# Patient Record
Sex: MALE | Race: WHITE | NOT HISPANIC OR LATINO | Employment: FULL TIME | URBAN - METROPOLITAN AREA
[De-identification: names, ages, dates, MRNs, and addresses within clinical notes are randomized per-mention and may not be internally consistent; named-entity substitution may affect disease eponyms.]

---

## 2019-05-05 ENCOUNTER — APPOINTMENT (OUTPATIENT)
Dept: RADIOLOGY | Facility: IMAGING CENTER | Age: 34
End: 2019-05-05
Attending: PHYSICIAN ASSISTANT
Payer: OTHER GOVERNMENT

## 2019-05-05 ENCOUNTER — OFFICE VISIT (OUTPATIENT)
Dept: URGENT CARE | Facility: CLINIC | Age: 34
End: 2019-05-05
Payer: OTHER GOVERNMENT

## 2019-05-05 VITALS
DIASTOLIC BLOOD PRESSURE: 70 MMHG | SYSTOLIC BLOOD PRESSURE: 118 MMHG | RESPIRATION RATE: 16 BRPM | HEIGHT: 72 IN | TEMPERATURE: 98.3 F | HEART RATE: 78 BPM | WEIGHT: 180 LBS | OXYGEN SATURATION: 99 % | BODY MASS INDEX: 24.38 KG/M2

## 2019-05-05 DIAGNOSIS — M25.361 UNSTABLE KNEE, RIGHT: ICD-10-CM

## 2019-05-05 DIAGNOSIS — S83.411A SPRAIN OF MEDIAL COLLATERAL LIGAMENT OF RIGHT KNEE, INITIAL ENCOUNTER: ICD-10-CM

## 2019-05-05 DIAGNOSIS — M24.412 RECURRENT SHOULDER DISLOCATION, LEFT: ICD-10-CM

## 2019-05-05 PROCEDURE — 73564 X-RAY EXAM KNEE 4 OR MORE: CPT | Mod: TC,RT | Performed by: PHYSICIAN ASSISTANT

## 2019-05-05 PROCEDURE — 99203 OFFICE O/P NEW LOW 30 MIN: CPT | Performed by: PHYSICIAN ASSISTANT

## 2019-05-05 PROCEDURE — 73030 X-RAY EXAM OF SHOULDER: CPT | Mod: TC,LT | Performed by: PHYSICIAN ASSISTANT

## 2019-05-05 ASSESSMENT — ENCOUNTER SYMPTOMS
SHORTNESS OF BREATH: 0
FEVER: 0
DIZZINESS: 0
DOUBLE VISION: 0
SPEECH CHANGE: 0
LOSS OF CONSCIOUSNESS: 0
SEIZURES: 0
FOCAL WEAKNESS: 0
SENSORY CHANGE: 0
TREMORS: 0
PALPITATIONS: 0
BLURRED VISION: 0
HEADACHES: 0
COUGH: 0
CHILLS: 0
TINGLING: 0

## 2019-05-05 NOTE — PROGRESS NOTES
Subjective:      Emanuel Nascimento is a 33 y.o. male who presents with Knee Injury (xtoday during physical fitness test for [employer]. (R) knee pain, knee gave up and wanted to collapse. hurts when walking. )            Knee Injury   This is a new problem. The current episode started today (running). Pertinent negatives include no chest pain, chills, coughing, fever, headaches or rash. Nothing aggravates the symptoms. He has tried nothing for the symptoms.       Review of Systems   Constitutional: Negative for chills and fever.   Eyes: Negative for blurred vision and double vision.   Respiratory: Negative for cough and shortness of breath.    Cardiovascular: Negative for chest pain and palpitations.   Musculoskeletal:        Knee pain   Skin: Negative for rash.   Neurological: Negative for dizziness, tingling, tremors, sensory change, speech change, focal weakness, seizures, loss of consciousness and headaches.   All other systems reviewed and are negative.    PMH:  has no past medical history on file.  MEDS: No current outpatient prescriptions on file.  ALLERGIES: No Known Allergies  SURGHX: History reviewed. No pertinent surgical history.  SOCHX:  reports that he has been smoking.  He has been smoking about 1.00 pack per day. He has never used smokeless tobacco. He reports that he drinks alcohol. He reports that he does not use drugs.  FH: Family history was reviewed, no pertinent findings to report  Medications, Allergies, and current problem list reviewed today in Epic     Objective:     /70   Pulse 78   Temp 36.8 °C (98.3 °F) (Temporal)   Resp 16   Ht 1.829 m (6')   Wt 81.6 kg (180 lb)   SpO2 99%   BMI 24.41 kg/m²      Physical Exam   Constitutional: He is oriented to person, place, and time. He appears well-developed and well-nourished.  Non-toxic appearance. He does not have a sickly appearance. He does not appear ill. No distress.   HENT:   Head: Normocephalic and atraumatic.   Right Ear: External  ear normal.   Left Ear: External ear normal.   Eyes: Conjunctivae and EOM are normal.   Neck: Normal range of motion. Neck supple.   Cardiovascular: Normal rate, regular rhythm, normal heart sounds, intact distal pulses and normal pulses.    Pulmonary/Chest: Effort normal and breath sounds normal.   Musculoskeletal: He exhibits tenderness. He exhibits no edema or deformity.   Neg varus/valgus/post/anterior drawer.  No joint pain above or below injury.  Neurovascularly intact distally from injury.     Neurological: He is alert and oriented to person, place, and time. He has normal reflexes. He displays normal reflexes. He exhibits normal muscle tone. Coordination normal.   Skin: Skin is warm and dry. He is not diaphoretic.   Psychiatric: He has a normal mood and affect. His behavior is normal. Judgment and thought content normal.   Vitals reviewed.         5/5/2019 11:38 AM    HISTORY/REASON FOR EXAM:  Right knee pain after running injury      TECHNIQUE/EXAM DESCRIPTION AND NUMBER OF VIEWS:  4 views of the RIGHT knee.    COMPARISON: None    FINDINGS:  Bone density is normal.  There is no evidence of fracture or dislocation.  There is no evidence of arthropathy.  There is a small joint effusion.   Impression       No evidence of fracture or dislocation.  Small joint effusion is identified.     5/5/2019 11:38 AM    HISTORY/REASON FOR EXAM:  Left shoulder pain and prior history of dislocation      TECHNIQUE/EXAM DESCRIPTION AND NUMBER OF VIEWS:  3 views of the LEFT shoulder.    COMPARISON: None    FINDINGS:  Bone mineralization is normal.  There is no evidence of acute fracture.  There is no evidence of dislocation.  There is no evidence of arthropathy.  No abnormalities are identified in the soft tissues.   Impression       There is no evidence of acute fracture.          Assessment/Plan:   Patient is a 33-year-old male who presents with right knee instability.  He states that he was running when he felt his knee  become unstable.  There is no sharp pain but he is now limping.  He also has a history of dislocating his left shoulder.  This is been ongoing for 20 years.  He has never had this evaluated.  Vitals normal.  Negative anterior posterior drawer right knee.  He has no symptoms in his left shoulder.  X-rays show mild effusion the right knee.  Likely he has a sprain.  We will treat with rice therapy and exercising.  Referral sent to sports medicine.    1. Sprain of medial collateral ligament of right knee, initial encounter    - DX-KNEE COMPLETE 4+ LEFT; Future  - REFERRAL TO SPORTS MEDICINE    2. Recurrent shoulder dislocation, left    - DX-SHOULDER 2+ LEFT; Future  - REFERRAL TO SPORTS MEDICINE    Differential diagnosis, natural history, supportive care discussed. Follow-up with primary care provider within 7-10 days, emergency room precautions discussed.  Patient and/or family appears understanding of information.  Handout and review of patients diagnosis and treatment was discussed extensively.

## 2019-05-05 NOTE — LETTER
May 5, 2019         Patient: Emanuel Nascimento   YOB: 1985   Date of Visit: 5/5/2019           To Whom it May Concern:    Emanuel Nascimento was seen in my clinic on 5/5/2019. Please restrict him from running or long distance walking for an unstable knee.  Further restrictions pending consult from sports or orthopedic medicine.  If you have any questions or concerns, please don't hesitate to call.        Sincerely,           Vj Flores P.A.-C.  Electronically Signed

## 2019-05-05 NOTE — PATIENT INSTRUCTIONS
Shoulder Dislocation  A shoulder dislocation happens when the upper arm bone (humerus) moves out of the shoulder joint. The shoulder joint is the part of the shoulder where the humerus, shoulder blade (scapula), and collarbone (clavicle) meet.  What are the causes?  This condition is often caused by:  · A fall.  · A hit to the shoulder.  · A forceful movement of the shoulder.  What increases the risk?  This condition is more likely to develop in people who play sports.  What are the signs or symptoms?  Symptoms of this condition include:  · Deformity of the shoulder.  · Intense pain.  · Inability to move the shoulder.  · Numbness, weakness, or tingling in your neck or down your arm.  · Bruising or swelling around your shoulder.  How is this diagnosed?  This condition is diagnosed with a physical exam. After the exam, tests may be done to check for related problems. Tests that may be done include:  · X-ray. This may be done to check for broken bones.  · MRI. This may be done to check for damage to the tissues around the shoulder.  · Electromyogram. This may be done to check for nerve damage.  How is this treated?  This condition is treated with a procedure to place the humerus back in the joint. This procedure is called a reduction. There are two types of reduction:  · Closed reduction. In this procedure, the humerus is placed back in the joint without surgery. The health care provider uses his or her hands to guide the bone back into place.  · Open reduction. In this procedure, the humerus is placed back in the joint with surgery. An open reduction may be recommended if:  ¨ You have a weak shoulder joint or weak ligaments.  ¨ You have had more than one shoulder dislocation.  ¨ The nerves or blood vessels around your shoulder have been damaged.  After the humerus is placed back into the joint, your arm will be placed in a splint or sling to prevent it from moving. You will need to wear the splint or sling until your  shoulder heals. When the splint or sling is removed, you may have physical therapy to help improve the range of motion in your shoulder joint.  Follow these instructions at home:  If you have a splint or sling:  · Wear it as told by your health care provider. Remove it only as told by your health care provider.  · Loosen it if your fingers become numb and tingle, or if they turn cold and blue.  · Keep it clean and dry.  Bathing  · Do not take baths, swim, or use a hot tub until your health care provider approves. Ask your health care provider if you can take showers. You may only be allowed to take sponge baths for bathing.  · If your health care provider approves bathing and showering, cover your splint or sling with a watertight plastic bag to protect it from water. Do not let the splint or sling get wet.  Managing pain, stiffness, and swelling  · If directed, apply ice to the injured area.  ¨ Put ice in a plastic bag.  ¨ Place a towel between your skin and the bag.  ¨ Leave the ice on for 20 minutes, 2-3 times per day.  · Move your fingers often to avoid stiffness and to decrease swelling.  · Raise (elevate) the injured area above the level of your heart while you are sitting or lying down.  Driving  · Do not drive while wearing a splint or sling on a hand that you use for driving.  · Do not drive or operate heavy machinery while taking pain medicine.  Activity  · Return to your normal activities as told by your health care provider. Ask your health care provider what activities are safe for you.  · Perform range-of-motion exercises only as told by your health care provider.  · Exercise your hand by squeezing a soft ball. This helps to decrease stiffness and swelling in your hand and wrist.  General instructions  · Take over-the-counter and prescription medicines only as told by your health care provider.  · Do not use any tobacco products, including cigarettes, chewing tobacco, or e-cigarettes. Tobacco can delay  bone and tissue healing. If you need help quitting, ask your health care provider.  · Keep all follow-up visits as told by your health care provider. This is important.  Contact a health care provider if:  · Your splint or sling gets damaged.  Get help right away if:  · Your pain gets worse rather than better.  · You lose feeling in your arm or hand.  · Your arm or hand becomes white and cold.  This information is not intended to replace advice given to you by your health care provider. Make sure you discuss any questions you have with your health care provider.  Document Released: 09/12/2002 Document Revised: 08/13/2017 Document Reviewed: 04/11/2016  [employer] Patient Education © 2017 [employer]    Medial Collateral Knee Ligament Sprain, Phase I Rehab  Ask your health care provider which exercises are safe for you. Do exercises exactly as told by your health care provider and adjust them as directed. It is normal to feel mild stretching, pulling, tightness, or discomfort as you do these exercises, but you should stop right away if you feel sudden pain or your pain gets worse. Do not begin these exercises until told by your health care provider.  Stretching and range of motion exercises  These exercises warm up your muscles and joints and improve the movement and flexibility of your knee. These exercises also help to relieve pain, numbness, and tingling.  Exercise A: Knee flexion, passive  1. Start this exercise in one of these positions:  ¨ Lying on the floor in front of an open doorway, with your left / right heel and foot lightly touching the wall.  ¨ Lying on the floor with both feet on the wall.  2. Without using any effort, allow gravity to let your foot slide down the wall slowly until you feel a gentle stretch in the front of your left / right knee.  3. Hold this stretch for __________ seconds.  4. Return your leg to the starting position, using your healthy leg to do the work or to help if  needed.  Repeat __________ times. Complete this stretch __________ times a day.  Exercise B: Knee flexion, active  1. Lie on your back with both knees straight. If this causes back discomfort, bend your healthy knee so your foot is flat on the floor.  2. Slowly slide your left / right heel back toward your buttocks until you feel a gentle stretch in the front of your knee or thigh.  3. Hold this position for __________ seconds.  4. Slowly slide your left / right heel back to the starting position.  Repeat __________ times. Complete this exercise __________ times a day.  Exercise C: Knee extension, sitting  1. Sit with your left / right heel propped on a chair, a coffee table, or a footstool. Do not have anything under your knee to support it.  2. Allow your leg muscles to relax, letting gravity straighten out your knee. Do not let your knee roll inward. You should feel a stretch behind your left / right knee.  3. If told by your health care provider, deepen the stretch by placing a __________ weight on your thigh, just above your kneecap.  4. Hold this position for __________ seconds.  Repeat __________ times. Complete this stretch __________ times a day.  Strengthening exercises  These exercises build strength and endurance in your knee. Endurance is the ability to use your muscles for a long time, even after they get tired. Isometric exercises involve squeezing your muscles but not moving your knee.  Exercise D: Quadriceps, isometric  1. Lie on your back with your left / right leg extended and your other knee bent.  2. If told by your health care provider, put a rolled towel or small pillow under your left / right knee.  3. Slowly tense the muscles in the front of your left / right thigh by pushing your knee down. You should see your kneecap slide up toward your hip or see increased dimpling just above the knee.  4. For __________ seconds, keep the muscle as tight as you can without increasing your pain.  5. Relax  your muscles slowly and completely.  Repeat __________ times. Complete this exercise __________ times a day.  Exercise E: Hamstring, isometric  1. Lie on your back on a firm surface.  2. Bend your left / right knee about __________ degrees. You can prop your knee on a pillow if needed.  3. Dig your heel down and back into the surface as if you are trying to pull your heel toward your buttocks. Tighten the muscles in the back of your thighs to “dig” as hard as you can without increasing any pain.  4. Hold this position for __________ seconds.  5. Relax your muscles slowly and completely.  Repeat __________ times. Complete this exercise __________ times a day.  This information is not intended to replace advice given to you by your health care provider. Make sure you discuss any questions you have with your health care provider.  Document Released: 12/18/2006 Document Revised: 08/24/2017 Document Reviewed: 10/29/2016  [employer] Interactive Patient Education © 2017 [employer]

## 2019-05-13 ENCOUNTER — OFFICE VISIT (OUTPATIENT)
Dept: MEDICAL GROUP | Facility: CLINIC | Age: 34
End: 2019-05-13
Payer: OTHER GOVERNMENT

## 2019-05-13 VITALS
HEART RATE: 118 BPM | DIASTOLIC BLOOD PRESSURE: 80 MMHG | TEMPERATURE: 98.3 F | BODY MASS INDEX: 24.38 KG/M2 | SYSTOLIC BLOOD PRESSURE: 118 MMHG | OXYGEN SATURATION: 98 % | WEIGHT: 180 LBS | HEIGHT: 72 IN | RESPIRATION RATE: 16 BRPM

## 2019-05-13 DIAGNOSIS — M25.561 ACUTE PAIN OF RIGHT KNEE: ICD-10-CM

## 2019-05-13 DIAGNOSIS — M24.412 RECURRENT SHOULDER DISLOCATION, LEFT: ICD-10-CM

## 2019-05-13 DIAGNOSIS — M62.9 HAMSTRING TIGHTNESS OF BOTH LOWER EXTREMITIES: ICD-10-CM

## 2019-05-13 DIAGNOSIS — M22.2X1 PATELLOFEMORAL DISORDER, RIGHT: ICD-10-CM

## 2019-05-13 DIAGNOSIS — M21.42 ACQUIRED PES PLANUS, LEFT: ICD-10-CM

## 2019-05-13 PROCEDURE — 99203 OFFICE O/P NEW LOW 30 MIN: CPT | Performed by: FAMILY MEDICINE

## 2019-05-13 NOTE — PROGRESS NOTES
"CHIEF COMPLAINT:  Emanuel Nascimento male presenting at the request of Vj Flores PA-C for evaluation of knee pain.     Emanuel Nascimento is complaining of right knee pain  Date of onset, [place], May 5, 2019  He was taking a physical fitness test for the   Running 1-1/2 miles, there was no specific injury, but during the first third of the run he experienced an episode of buckling in the RIGHT knee for no apparent reason  There was no irregularity in surface and there was no ankle sprain or twist  He continued running throughout the run and then noticed a repeat episode which was when he stopped running  Pain is at the anteromedial knee  Quality is sharp intermittent, pressure  Pain is non-radiating   Improved with resting  Aggravated by walking, pivoting, stairs make it feel \"weak\"  previous knee injury several years ago back in 2006, unknown cause of pain, but it was severe (stress fracture?)   Prior Treatments: seen at   Prior studies: X-Ray   Medications tried for pain include: naproxen (OTC) which helps  Mechanical Symptom history: No Locking and Popping and clicking which is not necessarily painful    Also history of BILATERAL shoulder issues, but his LEFT shoulder is worse than his right  Has had several dozen dislocations the LEFT shoulder last episode was back in 2017  Intermittent subluxations as well  He has had dislocations in his sleep when rolling over    REVIEW OF SYSTEMS  No Nausea, No Vomiting, No Chest Pain, No Shortness of Breath, No Dizziness, No Headache      PAST MEDICAL HISTORY:   History reviewed. No pertinent past medical history.    PMH:  has no past medical history on file.  MEDS: No current outpatient prescriptions on file.  ALLERGIES: No Known Allergies  SURGHX: No past surgical history on file.  SOCHX:  reports that he has been smoking.  He has been smoking about 1.00 pack per day. He has never used smokeless tobacco. He reports that he drinks alcohol. He reports that he does not " use drugs.  FH: Family history was reviewed, no pertinent findings to report     PHYSICAL EXAM:  /80 (BP Location: Right arm, Patient Position: Sitting, BP Cuff Size: Adult)   Pulse (!) 118   Temp 36.8 °C (98.3 °F) (Temporal)   Resp 16   Ht 1.829 m (6')   Wt 81.6 kg (180 lb)   SpO2 98%   BMI 24.41 kg/m²      well-developed, well-nourished in no apparent distress, alert and oriented x 3.  Gait: normal     RIGHT Knee:  Slight Varus and No Swelling  Range of Motion Intact  Trace effusion  Patellar Medial facet tenderness  Medial Joint Line Tenderness and NEGATIVE Lazaro  Lateral Joint Line Non-tender and NEGATIVE Lazaro  Trace Laxity with Varus stress  Trace Laxity with Valgus stress  Lachman's testing is Trace  Posterior Drawer Testing is Trace  The leg is otherwise neurovascularly intact    LEFT Knee:  Slight Varus and No Swelling   Range of Motion Intact  Trace effusion  Patellar No tenderness and no apprehension  Medial Joint Line Non-tender and NEGATIVE Lazaro  Lateral Joint Line Non-tender and NEGATIVE Lazaro  Trace Laxity with Varus stress  Trace Laxity with Valgus stress  Lachman's testing is Trace  Posterior Drawer Testing is Trace  The leg is otherwise neurovascularly intact    Additional Findings: Tight hamstrings    1. Acute pain of right knee  REFERRAL TO PHYSICAL THERAPY Reason for Therapy: Eval/Treat/Report   2. Patellofemoral disorder, right  REFERRAL TO PHYSICAL THERAPY Reason for Therapy: Eval/Treat/Report   3. Acquired pes planus, left     4. Hamstring tightness of both lower extremities  REFERRAL TO PHYSICAL THERAPY Reason for Therapy: Eval/Treat/Report   5. Recurrent shoulder dislocation, left       RIGHT anterior medial knee pain with quadriceps weakness and poor biomechanics  Referral for formal physical therapy  Fitted for patellar knee brace which helped which he can use for physical activity  Home exercise program provided  Continue anti-inflammatories as  needed    Patient's LEFT shoulder was not examined at today's visit, however given his history of recurrent dislocations and even dislocations while sleeping recommend orthopedic evaluation for consideration of arthroscopy    Return in about 4 weeks (around 6/10/2019).  To see how he is doing with formal physical therapy and his home exercise program as well as patellar stabilizer knee brace    We can also address his LEFT shoulder instability, consider MRI with arthrogram pending examination consider referral for surgical intervention        5/5/2019 11:38 AM    HISTORY/REASON FOR EXAM:  Right knee pain after running injury      TECHNIQUE/EXAM DESCRIPTION AND NUMBER OF VIEWS:  4 views of the RIGHT knee.    COMPARISON: None    FINDINGS:  Bone density is normal.  There is no evidence of fracture or dislocation.  There is no evidence of arthropathy.  There is a small joint effusion.   Impression       No evidence of fracture or dislocation.  Small joint effusion is identified.     done elsewhere and reviewed independently by me    Thank you Vj Flores PA-C for allowing me to participate in caring for your patient

## 2019-06-10 ENCOUNTER — OFFICE VISIT (OUTPATIENT)
Dept: MEDICAL GROUP | Facility: CLINIC | Age: 34
End: 2019-06-10
Payer: OTHER GOVERNMENT

## 2019-06-10 VITALS
TEMPERATURE: 98.6 F | HEART RATE: 98 BPM | SYSTOLIC BLOOD PRESSURE: 122 MMHG | HEIGHT: 72 IN | OXYGEN SATURATION: 99 % | DIASTOLIC BLOOD PRESSURE: 80 MMHG | RESPIRATION RATE: 18 BRPM | WEIGHT: 180 LBS | BODY MASS INDEX: 24.38 KG/M2

## 2019-06-10 DIAGNOSIS — M25.312 SHOULDER INSTABILITY, LEFT: ICD-10-CM

## 2019-06-10 DIAGNOSIS — M25.561 ACUTE PAIN OF RIGHT KNEE: ICD-10-CM

## 2019-06-10 PROCEDURE — 99214 OFFICE O/P EST MOD 30 MIN: CPT | Performed by: FAMILY MEDICINE

## 2019-06-10 NOTE — PROGRESS NOTES
CHIEF COMPLAINT:  Emanuel Nascimento male presenting for evaluation of LEFT Shoulder pain.     Emanuel Nascimento is complaining of left shoulder pain > R  Inittal dislocation in 2004, with pain worse in the past 2 years  Pain is at the anterior  Quality is sharp  Pain is Non-radiating  Aggravated by certain movements, even minor activity such as carrying his water bottle can be painful  Improved with  rest as well as massage  POSITIVE prior history of approximately dozens of dislocations with some event occurring at nighttime when lying prone with an abducted shoulder  Prior Treatments: None  Prior studies: X-Ray   Medications tried for pain include: Aleve intermittently which can help  Mechanical Symptom history: Popping as well as clicking    REVIEW OF SYSTEMS  No Nausea, No Vomiting, No Chest Pain, No Shortness of Breath, No Dizziness, No Headache    PAST MEDICAL HISTORY:   History reviewed. No pertinent past medical history.    PMH:  has no past medical history on file.  MEDS: No current outpatient prescriptions on file.  ALLERGIES: No Known Allergies  SURGHX: No past surgical history on file.  SOCHX:  reports that he has been smoking.  He has been smoking about 1.00 pack per day. He has never used smokeless tobacco. He reports that he drinks alcohol. He reports that he does not use drugs.  FH: Family history was reviewed, no pertinent findings to report     PHYSICAL EXAM:  /80 (BP Location: Left arm, Patient Position: Sitting, BP Cuff Size: Adult)   Pulse 98   Temp 37 °C (98.6 °F) (Temporal)   Resp 18   Ht 1.829 m (6')   Wt 81.6 kg (180 lb)   SpO2 99%   BMI 24.41 kg/m²      well-developed, well-nourished in no apparent distress, alert and oriented x 3.  Gait: normal    Cervical spine:  Range of motion Intact  Spurling's testing is NEGATIVE  Cervical spine tenderness NEGATIVE    Strength testing:     Deltoid, bilateral 5/5  Bicep, bilateral 5/5  Tricep, bilateral 5/5  Wrist Extension, bilateral 5/5  Wrist  "Flexion, bilateral 5/5  Finger Abduction, bilateral 5/5    Sensation:  INTACT Bilaterally    Reflexes:   Biceps: R 2+/L 2+  Triceps: R 2+/L  2+  Brachial radialis R 2+/L  2+  Hernandez's testing is NEGATIVE  The arms are otherwise neurovascularly intact     Shoulder Exam:    RIGHT Shoulder:  No visible swelling   Range of motion INTACT  Tenderness: Non-tender  Empty Can Testing 5/5  Internal Rotation 5/5  External Rotation 5/5  Lift Off Testing 5/5  Impingement testing [place]  NEGATIVE  Neer's testing NEGATIVE  Apprehension testing POSITIVE  Relocation testing POSITIVE  Ogden's Testing NEGATIVE  Grind Testing NEGATIVE      LEFT Shoulder:  No visible swelling   Range of motion MILDLY DIMINISHED with pain  Tenderness: Non-tender  Empty Can Testing 5/5  Internal Rotation 5/5  External Rotation 5/5  Lift Off Testing 5/5  Impingement testing [place]  POSITIVE  Neer's testing POSITIVE  Apprehension testing POSITIVE  Relocation testing POSITIVE  Ogden's Testing Equivocal with pain during both thumb down and thumb up maneuver  Grind Testing POSITIVE    Additional Findings: None and Flexed Posture    1. Shoulder instability, left  MR-SHOULDER-WITH LEFT    REFERRAL TO ORTHOPEDICS   2. Acute pain of right knee       LEFT shoulder instability  Patient self-reports greater than 2 dozen episodes of dislocations of the LEFT shoulder  He has had dislocations occurring while lying prone with an abducted shoulder while sleeping  POSITIVE labral signs on examination  Given the above findings, recommend MRI with arthrogram for evaluation of the labrum  Also, given his history of \"greater than 2 dozen\" dislocations, recommend orthopedic evaluation for consideration of arthroscopy/labral repair  Since it would likely take him a while to get into see the orthopedist, we can have him come back to discuss the MRI results after he obtains the study    Regarding his RIGHT knee pain  He is currently pending physical [facility] (starts today) [facility] [place]  We will see how he responds to therapy    No Follow-up on file.  To discuss LEFT shoulder MRI with arthrogram results        5/5/2019 11:38 AM    HISTORY/REASON FOR EXAM:  Left shoulder pain and prior history of dislocation      TECHNIQUE/EXAM DESCRIPTION AND NUMBER OF VIEWS:  3 views of the LEFT shoulder.    COMPARISON: None    FINDINGS:  Bone mineralization is normal.  There is no evidence of acute fracture.  There is no evidence of dislocation.  There is no evidence of arthropathy.  No abnormalities are identified in the soft tissues.   Impression       There is no evidence of acute fracture.     done elsewhere and reviewed independently by me  Small calcification noted on the internal rotation view at the lateral aspect of the humeral head not mentioned on the official report    Thank you Vj Flores PA-C for allowing me to participate in caring for your patient

## 2020-03-06 ENCOUNTER — OFFICE VISIT (OUTPATIENT)
Dept: URGENT CARE | Facility: CLINIC | Age: 35
End: 2020-03-06
Payer: OTHER GOVERNMENT

## 2020-03-06 VITALS
BODY MASS INDEX: 26.93 KG/M2 | HEIGHT: 72 IN | DIASTOLIC BLOOD PRESSURE: 80 MMHG | HEART RATE: 102 BPM | TEMPERATURE: 98 F | OXYGEN SATURATION: 96 % | SYSTOLIC BLOOD PRESSURE: 110 MMHG | WEIGHT: 198.8 LBS | RESPIRATION RATE: 16 BRPM

## 2020-03-06 DIAGNOSIS — S86.911S STRAIN OF RIGHT KNEE, SEQUELA: ICD-10-CM

## 2020-03-06 PROCEDURE — 99214 OFFICE O/P EST MOD 30 MIN: CPT | Performed by: NURSE PRACTITIONER

## 2020-03-06 RX ORDER — IBUPROFEN 600 MG/1
600 TABLET ORAL EVERY 6 HOURS PRN
Qty: 30 TAB | Refills: 0 | Status: SHIPPED | OUTPATIENT
Start: 2020-03-06 | End: 2023-02-08

## 2020-03-06 ASSESSMENT — ENCOUNTER SYMPTOMS
FEVER: 0
WEAKNESS: 0
JOINT SWELLING: 1
FALLS: 0

## 2020-03-06 NOTE — PATIENT INSTRUCTIONS
Knee Pain  Knee pain is a very common symptom and can have many causes. Knee pain often goes away when you follow your health care provider's instructions for relieving pain and discomfort at home. However, knee pain can develop into a condition that needs treatment. Some conditions may include:  · Arthritis caused by wear and tear (osteoarthritis).  · Arthritis caused by swelling and irritation (rheumatoid arthritis or gout).  · A cyst or growth in your knee.  · An infection in your knee joint.  · An injury that will not heal.  · Damage, swelling, or irritation of the tissues that support your knee (torn ligaments or tendinitis).  If your knee pain continues, additional tests may be ordered to diagnose your condition. Tests may include X-rays or other imaging studies of your knee. You may also need to have fluid removed from your knee. Treatment for ongoing knee pain depends on the cause, but treatment may include:  · Medicines to relieve pain or swelling.  · Steroid injections in your knee.  · Physical therapy.  · Surgery.  HOME CARE INSTRUCTIONS  · Take medicines only as directed by your health care provider.  · Rest your knee and keep it raised (elevated) while you are resting.  · Do not do things that cause or worsen pain.  · Avoid high-impact activities or exercises, such as running, jumping rope, or doing jumping jacks.  · Apply ice to the knee area:  ¨ Put ice in a plastic bag.  ¨ Place a towel between your skin and the bag.  ¨ Leave the ice on for 20 minutes, 2-3 times a day.  · Ask your health care provider if you should wear an elastic knee support.  · Keep a pillow under your knee when you sleep.  · Lose weight if you are overweight. Extra weight can put pressure on your knee.  · Do not use any tobacco products, including cigarettes, chewing tobacco, or electronic cigarettes. If you need help quitting, ask your health care provider. Smoking may slow the healing of any bone and joint problems that you may  have.  SEEK MEDICAL CARE IF:  · Your knee pain continues, changes, or gets worse.  · You have a fever along with knee pain.  · Your knee willie or locks up.  · Your knee becomes more swollen.  SEEK IMMEDIATE MEDICAL CARE IF:   · Your knee joint feels hot to the touch.  · You have chest pain or trouble breathing.  This information is not intended to replace advice given to you by your health care provider. Make sure you discuss any questions you have with your health care provider.  Document Released: 10/14/2008 Document Revised: 01/08/2016 Document Reviewed: 08/03/2015  Elsevier Interactive Patient Education © 2017 [employer]

## 2020-03-06 NOTE — PROGRESS NOTES
"  Subjective:     Emanuel Nascimento is a 34 y.o. male who presents for Knee Pain (x 9 months,  Rt. knee pain and stiffness)      Rt knee pain and weakness with running. Knee became more painful with run today. Sharp intermittent to lateral lower knee. No current pain with sitting. Pain is \"being nice\" right now, random pain with walking. Does not give out. States, it feels swollen even though it does not look to be swollen. Was seen 5/19, dx with MCL sprain. Has follow up with Sports Med and physical therapy. Since then has had numbness along outer and top knee. Has continued to do rom and exercises he had done during PT. Also has intermittent tension to leg, distal and proximal to knee, to \"where it is almost painful\". No current PCP.    Knee Pain   This is a recurrent problem. The current episode started today. The problem occurs constantly. The problem has been waxing and waning. Associated symptoms include joint swelling. Pertinent negatives include no fever or weakness. The symptoms are aggravated by walking and bending. He has tried rest for the symptoms. The treatment provided mild relief.       History reviewed. No pertinent past medical history.    History reviewed. No pertinent surgical history.    Social History     Socioeconomic History   • Marital status: Single     Spouse name: Not on file   • Number of children: Not on file   • Years of education: Not on file   • Highest education level: Not on file   Occupational History   • Not on file   Social Needs   • Financial resource strain: Not on file   • Food insecurity     Worry: Not on file     Inability: Not on file   • Transportation needs     Medical: Not on file     Non-medical: Not on file   Tobacco Use   • Smoking status: Current Every Day Smoker     Packs/day: 1.00   • Smokeless tobacco: Never Used   Substance and Sexual Activity   • Alcohol use: Yes     Comment: Moderate   • Drug use: No   • Sexual activity: Yes     Partners: Female   Lifestyle   • " Physical activity     Days per week: Not on file     Minutes per session: Not on file   • Stress: Not on file   Relationships   • Social connections     Talks on phone: Not on file     Gets together: Not on file     Attends [employer] service: Not on file     Active member of club or organization: Not on file     Attends meetings of clubs or organizations: Not on file     Relationship status: Not on file   • Intimate partner violence     Fear of current or ex partner: Not on file     Emotionally abused: Not on file     Physically abused: Not on file     Forced sexual activity: Not on file   Other Topics Concern   • Not on file   Social History Narrative   • Not on file        History reviewed. No pertinent family history.     No Known Allergies    Review of Systems   Constitutional: Negative for fever.   Musculoskeletal: Positive for joint pain and joint swelling. Negative for falls.   Neurological: Negative for weakness.   All other systems reviewed and are negative.       Objective:   /80 (BP Location: Left arm, Patient Position: Sitting, BP Cuff Size: Large adult)   Pulse (!) 102   Temp 36.7 °C (98 °F) (Temporal)   Resp 16   Ht 1.829 m (6')   Wt 90.2 kg (198 lb 12.8 oz)   SpO2 96%   BMI 26.96 kg/m²     Physical Exam  Vitals signs reviewed.   Constitutional:       General: He is not in acute distress.     Appearance: He is well-developed.   HENT:      Head: Normocephalic and atraumatic.      Right Ear: External ear normal.      Left Ear: External ear normal.      Nose: Nose normal.   Eyes:      Conjunctiva/sclera: Conjunctivae normal.   Neck:      Musculoskeletal: Normal range of motion.   Cardiovascular:      Rate and Rhythm: Normal rate.   Pulmonary:      Effort: Pulmonary effort is normal.   Musculoskeletal: Normal range of motion.         General: Tenderness present. No swelling, deformity or signs of injury.      Right knee: He exhibits normal range of motion, no swelling, no effusion, no  ecchymosis, no deformity, no erythema, normal alignment, no LCL laxity, normal patellar mobility and no MCL laxity. Tenderness found. LCL tenderness noted. No patellar tendon tenderness noted.      Comments: Steady gait. Distal neurovascular intact.    Skin:     General: Skin is warm and dry.      Findings: No bruising or erythema.   Neurological:      General: No focal deficit present.      Mental Status: He is alert and oriented to person, place, and time.      GCS: GCS eye subscore is 4. GCS verbal subscore is 5. GCS motor subscore is 6.   Psychiatric:         Mood and Affect: Mood normal.         Speech: Speech normal.         Behavior: Behavior normal.         Thought Content: Thought content normal.         Judgment: Judgment normal.         Assessment/Plan:   1. Strain of right knee, sequela  - REFERRAL TO FOLLOW-UP WITH PRIMARY CARE  - REFERRAL TO SPORTS MEDICINE  - ibuprofen (MOTRIN) 600 MG Tab; Take 1 Tab by mouth every 6 hours as needed for Mild Pain, Moderate Pain or Inflammation.  Dispense: 30 Tab; Refill: 0  -NSAID's (ibuprofen) and tylenol as directed for pain and inflammation.   -RICE Therapy: Rest, Ice, Compression, Elevation  -No running or strenuous activity x 7 days, follow up with Sport's Medicine for continued pain.     Follow up with PCP. Follow up emergently for severe uncontrolled pain, neurovascular compromise (decreased sensation, motion, or circulation). Follow up if symptoms persist for more than six to eight weeks.    Differential diagnosis, natural history, supportive care, and indications for immediate follow-up discussed.

## 2020-03-06 NOTE — LETTER
March 6, 2020         Patient: Emanuel Nascimento   YOB: 1985   Date of Visit: 3/6/2020           To Whom it May Concern:    Emanuel Nascimento was seen in my clinic on 3/6/2020. He may return to work on 03/09/2020. Recommendation to avoid running or strenuous activity due to right knee strain for 1 week. Has history of, and previously treated for similar symptoms May, 2019. If symptoms persist, recommend follow up with Sport's Medicine specialist for further evaluation and recommended therapy.    If you have any questions or concerns, please don't hesitate to call.        Sincerely,           TAMIKO Kiran.  Electronically Signed

## 2020-07-01 ENCOUNTER — OFFICE VISIT (OUTPATIENT)
Dept: MEDICAL GROUP | Facility: CLINIC | Age: 35
End: 2020-07-01
Payer: OTHER GOVERNMENT

## 2020-07-01 VITALS
SYSTOLIC BLOOD PRESSURE: 118 MMHG | HEIGHT: 72 IN | WEIGHT: 198 LBS | RESPIRATION RATE: 12 BRPM | OXYGEN SATURATION: 98 % | TEMPERATURE: 98.2 F | DIASTOLIC BLOOD PRESSURE: 80 MMHG | BODY MASS INDEX: 26.82 KG/M2 | HEART RATE: 88 BPM

## 2020-07-01 DIAGNOSIS — M25.561 CHRONIC PAIN OF RIGHT KNEE: ICD-10-CM

## 2020-07-01 DIAGNOSIS — G89.29 CHRONIC PAIN OF RIGHT KNEE: ICD-10-CM

## 2020-07-01 DIAGNOSIS — M22.2X1 RIGHT PATELLOFEMORAL SYNDROME: ICD-10-CM

## 2020-07-01 PROCEDURE — 99213 OFFICE O/P EST LOW 20 MIN: CPT | Mod: 25 | Performed by: FAMILY MEDICINE

## 2020-07-01 PROCEDURE — 20610 DRAIN/INJ JOINT/BURSA W/O US: CPT | Mod: RT | Performed by: FAMILY MEDICINE

## 2020-07-01 RX ORDER — TRIAMCINOLONE ACETONIDE 40 MG/ML
40 INJECTION, SUSPENSION INTRA-ARTICULAR; INTRAMUSCULAR ONCE
Status: COMPLETED | OUTPATIENT
Start: 2020-07-01 | End: 2020-07-01

## 2020-07-01 RX ADMIN — TRIAMCINOLONE ACETONIDE 40 MG: 40 INJECTION, SUSPENSION INTRA-ARTICULAR; INTRAMUSCULAR at 11:55

## 2020-07-01 ASSESSMENT — ENCOUNTER SYMPTOMS
FOCAL WEAKNESS: 0
VOMITING: 0
FEVER: 0
SHORTNESS OF BREATH: 0

## 2020-07-01 NOTE — PROGRESS NOTES
"Subjective:     Emanuel Nascimento is a 35 y.o. male who presents for evaluation of right knee pain    HPI  Pt presents for evaluation of right knee pain   Right knee has bothered him for about 15 years   Pain is \"on and off\"   Pain has been bothering him more lately   Pain bothers him when going downstairs, after sitting with knees bent for too long, and first thing in the morning  Pain is migratory throughout the knee, however never radiates to hip or foot  Did PT back in 2006 and helped greatly   Did another round of PT 1 year ago and didn't help as much   Pt active duty    Has a patellar stabilization brace which he wears when running at times and helps     Review of Systems   Constitutional: Negative for fever.   Respiratory: Negative for shortness of breath.    Cardiovascular: Negative for chest pain.   Gastrointestinal: Negative for vomiting.   Skin: Negative for rash.   Neurological: Negative for focal weakness.     PMH: Hx chronic knee pain   MEDS:   Current Outpatient Medications:   •  ibuprofen (MOTRIN) 600 MG Tab, Take 1 Tab by mouth every 6 hours as needed for Mild Pain, Moderate Pain or Inflammation., Disp: 30 Tab, Rfl: 0    Current Facility-Administered Medications:   •  triamcinolone acetonide (KENALOG-40) injection 40 mg, 40 mg, Intra-articular, Once, Saran Cooper M.D.  ALLERGIES: No Known Allergies  SURGHX: History reviewed. No pertinent surgical history.  SOCHX:  reports that he has been smoking. He has been smoking about 1.00 pack per day. He has never used smokeless tobacco. He reports current alcohol use. He reports that he does not use drugs.  FH: Family history was reviewed, not contributing to chronic knee pain      Objective:   /80 (BP Location: Left arm, Patient Position: Sitting, BP Cuff Size: Adult)   Pulse 88   Temp 36.8 °C (98.2 °F) (Temporal)   Resp 12   Ht 1.829 m (6')   Wt 89.8 kg (198 lb)   SpO2 98%   BMI 26.85 kg/m²     Physical Exam  Constitutional:       " General: He is not in acute distress.     Appearance: He is well-developed. He is not diaphoretic.   HENT:      Head: Normocephalic and atraumatic.   Musculoskeletal:      Comments: Right knee  Appearance - No bruising, erythema, or deformity appreciated  Palpation - No tenderness to palpation along joint lines, patellar tendon, hamstring tendons, or quads.  No palpable effusion.  ROM - FROM with mild crepitus  Strength - 5/5 throughout  Neuro - Sensation equal and intact bilaterally  Special testing - No laxity or pain with varus/valgus stress, neg anterior drawer, neg posterior drawer, neg Lachman's, neg Lazaro's, +patellar apprehension test   Skin:     General: Skin is warm and dry.      Findings: No rash.   Neurological:      Mental Status: He is alert and oriented to person, place, and time.   Psychiatric:         Behavior: Behavior normal.         Thought Content: Thought content normal.         Judgment: Judgment normal.       Knee injection   First, a verbal consent and a verbal time-out were done, explaining the risks and benefits of the procedure. Then the patient was placed in a seated position.  Anterior lateral joint line portals were identified. The skin was cleaned with alcohol and the clean, no touch technique was used with a 25-gauge 1.5-inch needle. A combination of 5 mL of 1% lidocaine without epinephrine and 1 milliliter of Kenalog 40 mg/mL was injected into the right knee. The patient tolerated the procedure well and there were no immediate post injection complications. Hemostasis was then achieved with gentle pressure and a Band-Aid was placed over the lesion. Post injection instructions for range of motion, ice, activity modification, signs of infection, emergency precautions were discussed with the patient.    Assessment/Plan:   Assessment    1. Right patellofemoral syndrome  - triamcinolone acetonide (KENALOG-40) injection 40 mg  - REFERRAL TO PHYSICAL THERAPY Reason for Therapy:  Eval/Treat/Report    2. Chronic pain of right knee  - triamcinolone acetonide (KENALOG-40) injection 40 mg  - REFERRAL TO PHYSICAL THERAPY Reason for Therapy: Eval/Treat/Report    Patient with chronic right knee pain secondary to patellofemoral syndrome.  Reviewed multiple treatment etiologies including bracing, PT, injections, and other management options.  Patient already has a patellar stabilization brace and is interested in attending physical therapy.  He is also wanting to try injection to help with more immediate knee pain relief and, as above, was tolerated well without acute complication.  Patient will follow-up after a few weeks of PT to monitor progress.

## 2020-07-01 NOTE — LETTER
"      July 1, 2020    To Whom It May Concern:         This is confirmation that Emanuel Nascimento attended his scheduled appointment with Saran Cooper M.D. on 7/01/20.  He has patellofemoral syndrome.  He will be going to physical therapy to work on this.  He was also given a Kenalog injection intra-articularly to help with pain.  He should not be running at this time until he is improving.  Please allow him to perform \"walk\" version of fitness test.         If you have any questions please do not hesitate to call me at the phone number listed below.    Sincerely,          Saran Cooper M.D.  [phone]                "

## 2022-09-27 ENCOUNTER — OFFICE VISIT (OUTPATIENT)
Dept: URGENT CARE | Facility: PHYSICIAN GROUP | Age: 37
End: 2022-09-27
Payer: OTHER GOVERNMENT

## 2022-09-27 VITALS
WEIGHT: 190 LBS | DIASTOLIC BLOOD PRESSURE: 70 MMHG | OXYGEN SATURATION: 95 % | RESPIRATION RATE: 16 BRPM | BODY MASS INDEX: 25.73 KG/M2 | HEART RATE: 98 BPM | TEMPERATURE: 98.7 F | SYSTOLIC BLOOD PRESSURE: 114 MMHG | HEIGHT: 72 IN

## 2022-09-27 DIAGNOSIS — R05.9 COUGH: ICD-10-CM

## 2022-09-27 DIAGNOSIS — H65.91 RIGHT OTITIS MEDIA WITH EFFUSION: ICD-10-CM

## 2022-09-27 DIAGNOSIS — J01.00 ACUTE NON-RECURRENT MAXILLARY SINUSITIS: Primary | ICD-10-CM

## 2022-09-27 PROCEDURE — 99213 OFFICE O/P EST LOW 20 MIN: CPT | Performed by: EMERGENCY MEDICINE

## 2022-09-27 RX ORDER — AMOXICILLIN AND CLAVULANATE POTASSIUM 875; 125 MG/1; MG/1
1 TABLET, FILM COATED ORAL 2 TIMES DAILY
Qty: 20 TABLET | Refills: 0 | Status: SHIPPED | OUTPATIENT
Start: 2022-09-27 | End: 2022-10-07

## 2022-09-27 RX ORDER — BENZONATATE 100 MG/1
100 CAPSULE ORAL 3 TIMES DAILY PRN
Qty: 30 CAPSULE | Refills: 0 | Status: SHIPPED | OUTPATIENT
Start: 2022-09-27 | End: 2023-02-08

## 2022-09-27 ASSESSMENT — ENCOUNTER SYMPTOMS
VOMITING: 0
FEVER: 1
NAUSEA: 0
SHORTNESS OF BREATH: 0
SORE THROAT: 1
SPUTUM PRODUCTION: 0
COUGH: 1

## 2022-09-27 NOTE — PROGRESS NOTES
Subjective:     Emanuel Nascimento  is a 37 y.o. male who presents for URI (X 1.5 weeks with sinus pressure, congestion, cough and loss of appetite )       Patient is a 37-year-old male who presents complaining of nasal congestion, ear pressure, facial pressure, hoarse voice, cough spells.  Denies any associated chest pain or shortness of breath.  He had a fever 2 days ago which resolved with Tylenol.  He has been taking Mucinex DM without any resolution.  He is a smoker.  Past medical history is otherwise negative for any major health problems, denies diabetes.  No known allergies.  He is COVID vaccinated.  He travels for work, just returned from [facility], is not aware of any specific exposure.   Review of Systems   Constitutional:  Positive for fever.   HENT:  Positive for ear pain and sore throat.    Respiratory:  Positive for cough. Negative for sputum production and shortness of breath.    Cardiovascular:  Negative for chest pain.   Gastrointestinal:  Negative for nausea and vomiting.   All other systems reviewed and are negative. No Known Allergies  History reviewed. No pertinent past medical history.     Objective:   /70 (BP Location: Left arm, Patient Position: Sitting, BP Cuff Size: Adult)   Pulse 98   Temp 37.1 °C (98.7 °F) (Temporal)   Resp 16   Ht 1.829 m (6')   Wt 86.2 kg (190 lb)   SpO2 95%   BMI 25.77 kg/m²   Physical Exam  Vitals and nursing note reviewed.   Constitutional:       Appearance: Normal appearance. He is not ill-appearing, toxic-appearing or diaphoretic.   HENT:      Head: Normocephalic and atraumatic.      Ears:      Comments: Bulging TMs bilaterally.  Right TM erythematous.  Purulent fluid particularly noted behind left eardrum.     Nose: No rhinorrhea.      Comments: Maxillary sinus pressure     Mouth/Throat:      Mouth: Mucous membranes are moist.      Pharynx: No oropharyngeal exudate or posterior oropharyngeal erythema.   Eyes:      General: No scleral icterus.  Cardiovascular:       Rate and Rhythm: Normal rate and regular rhythm.      Heart sounds: Normal heart sounds. No murmur heard.  Pulmonary:      Effort: Pulmonary effort is normal. No respiratory distress.      Breath sounds: Normal breath sounds. No wheezing or rhonchi.   Skin:     General: Skin is warm.      Findings: No rash.   Neurological:      Mental Status: He is alert and oriented to person, place, and time.   Psychiatric:         Mood and Affect: Mood normal.         Behavior: Behavior normal.         Thought Content: Thought content normal.         Assessment/Plan:     Encounter Diagnoses   Name Primary?    Acute non-recurrent maxillary sinusitis Yes    Right otitis media with effusion     Cough      Patient already has 10 days of symptoms refractory to decongestants alone, will start him on antibiotics for sinusitis, as well as add Tessalon for cough symptomatic relief since that is bothering him significantly.  Clinically no concern for pneumonia, normal respiratory rate and oxygen sat for smoker.    Assessment    1. Acute non-recurrent maxillary sinusitis  - amoxicillin-clavulanate (AUGMENTIN) 875-125 MG Tab; Take 1 Tablet by mouth 2 times a day for 10 days.  Dispense: 20 Tablet; Refill: 0    2. Right otitis media with effusion    3. Cough  - benzonatate (TESSALON) 100 MG Cap; Take 1 Capsule by mouth 3 times a day as needed for Cough.  Dispense: 30 Capsule; Refill: 0    See AVS Instructions below for written guidance provided to patient on after-visit management and care in addition to our verbal discussion during the visit.    Please note that this dictation was created using voice recognition software. I have attempted to correct all errors, but there may be sound-alike, spelling, grammar and possibly content errors that I did not discover before finalizing the note.

## 2022-09-27 NOTE — PATIENT INSTRUCTIONS
Use flonase 1 squirt each nostril twice daily x 3-4 days then daily as needed, Over-the-counter decongestant of your choice as directed (sudafed or similar, chlorpheniramine if history of high blood pressure)., Tylenol or ibuprofen as directed for pain or fever., Throat lozenges with benzocaine or salt water gargles may help with throat pain., Drink plenty of fluids. , and Followup with your doctor if not improved, return if shortness of breath, vomiting, unable to swallow, worse.

## 2022-10-22 ENCOUNTER — OFFICE VISIT (OUTPATIENT)
Dept: URGENT CARE | Facility: PHYSICIAN GROUP | Age: 37
End: 2022-10-22
Payer: OTHER GOVERNMENT

## 2022-10-22 VITALS
SYSTOLIC BLOOD PRESSURE: 124 MMHG | OXYGEN SATURATION: 98 % | RESPIRATION RATE: 20 BRPM | TEMPERATURE: 97.6 F | HEART RATE: 94 BPM | BODY MASS INDEX: 24.67 KG/M2 | HEIGHT: 72 IN | DIASTOLIC BLOOD PRESSURE: 76 MMHG | WEIGHT: 182.13 LBS

## 2022-10-22 DIAGNOSIS — S86.911A KNEE STRAIN, RIGHT, INITIAL ENCOUNTER: ICD-10-CM

## 2022-10-22 PROCEDURE — 99213 OFFICE O/P EST LOW 20 MIN: CPT | Performed by: FAMILY MEDICINE

## 2022-10-22 ASSESSMENT — ENCOUNTER SYMPTOMS
SORE THROAT: 0
DIZZINESS: 0
NAUSEA: 0
VOMITING: 0
CHILLS: 0
MYALGIAS: 0
SHORTNESS OF BREATH: 0
FEVER: 0
COUGH: 0

## 2022-10-22 NOTE — PROGRESS NOTES
Subjective:   Emanuel Nascimento is a 37 y.o. male who presents for Knee Injury (Right knee x1 day but chronic )        Knee Injury  This is a new (Reports knee pain, onset yesterday after running) problem. Pertinent negatives include no chills, coughing, fever, myalgias, nausea, rash, sore throat or vomiting. Associated symptoms comments: Reports unable to complete 1 of 1/2 miles for physical fitness test yesterday from symptoms of acute pain in the knee and clicking in the knee    Similar symptoms previously which has improved with physical therapy for daily activity at continues to report intermittent pain with running    Reports  mild swelling today, anterior knee pain with extension and flexion and ambulation    Denies locking of the knee  Denies recent traumatic injury or fall    History of previous patellofemoral syndrome evaluated in sports medicine clinic. Treatments tried: Sports medicine clinic evaluation, physical therapy, knee brace. The treatment provided mild relief.   PMH:  has no past medical history on file.  MEDS:   Current Outpatient Medications:     benzonatate (TESSALON) 100 MG Cap, Take 1 Capsule by mouth 3 times a day as needed for Cough. (Patient not taking: Reported on 10/22/2022), Disp: 30 Capsule, Rfl: 0    ibuprofen (MOTRIN) 600 MG Tab, Take 1 Tab by mouth every 6 hours as needed for Mild Pain, Moderate Pain or Inflammation. (Patient not taking: No sig reported), Disp: 30 Tab, Rfl: 0  ALLERGIES: No Known Allergies  SURGHX: History reviewed. No pertinent surgical history.  SOCHX:  reports that he has been smoking cigarettes. He has been smoking an average of 1 pack per day. He has never used smokeless tobacco. He reports current alcohol use. He reports that he does not use drugs.  FH: History reviewed. No pertinent family history.  Review of Systems   Constitutional:  Negative for chills and fever.   HENT:  Negative for sore throat.    Respiratory:  Negative for cough and shortness of breath.     Gastrointestinal:  Negative for nausea and vomiting.   Musculoskeletal:  Negative for myalgias.   Skin:  Negative for rash.   Neurological:  Negative for dizziness.      Objective:   /76 (BP Location: Left arm, Patient Position: Sitting, BP Cuff Size: Adult)   Pulse 94   Temp 36.4 °C (97.6 °F) (Temporal)   Resp 20   Ht 1.829 m (6')   Wt 82.6 kg (182 lb 2 oz)   SpO2 98%   BMI 24.70 kg/m²   Physical Exam  Vitals and nursing note reviewed.   Constitutional:       General: He is not in acute distress.     Appearance: He is well-developed.   HENT:      Head: Normocephalic and atraumatic.      Right Ear: External ear normal.      Left Ear: External ear normal.      Nose: Nose normal.      Mouth/Throat:      Mouth: Mucous membranes are moist.   Eyes:      Conjunctiva/sclera: Conjunctivae normal.   Cardiovascular:      Rate and Rhythm: Normal rate.   Pulmonary:      Effort: Pulmonary effort is normal. No respiratory distress.      Breath sounds: Normal breath sounds.   Abdominal:      General: There is no distension.   Musculoskeletal:         General: Normal range of motion.      Right knee: Swelling and crepitus present. No effusion. Normal range of motion. Tenderness (Subpatellar) present. No LCL laxity, MCL laxity, ACL laxity or PCL laxity.      Instability Tests: Medial Lazaro test negative and lateral Lazaro test negative.   Skin:     General: Skin is warm and dry.   Neurological:      General: No focal deficit present.      Mental Status: He is alert and oriented to person, place, and time. Mental status is at baseline.      Gait: Gait abnormal (Minimally antalgic right).   Psychiatric:         Judgment: Judgment normal.         Assessment/Plan:   1. Knee strain, right, initial encounter        Medical Decision Making/Course:  In the course of preparing for this visit with review of the pertinent past medical history, recent and past clinic visits, current medications, and performing chart,  immunization, medical history and medication reconciliation, and in the further course of obtaining the current history pertinent to the clinic visit today, performing an exam and evaluation, ordering and independently evaluating labs, tests  , and/or procedures, prescribing any recommended new medications as noted above, providing any pertinent counseling and education and recommending further coordination of care including recommendations to follow-up with orthopedic sports medicine clinic for follow-up and consideration of further physical therapy and/or consideration for steroid injection and including drafting a work excuse letter, at least  21 minutes of total time were spent during this encounter.      Discussed close monitoring, return precautions, and supportive measures of maintaining adequate fluid hydration and caloric intake, relative rest and symptom management as needed for pain and/or fever.    Differential diagnosis, natural history, supportive care, and indications for immediate follow-up discussed.     Advised the patient to follow-up with the primary care physician for recheck, reevaluation, and consideration of further management.    Please note that this dictation was created using voice recognition software. I have worked with consultants from the vendor as well as technical experts from [employer] to optimize the interface. I have made every reasonable attempt to correct obvious errors, but I expect that there are errors of grammar and possibly content that I did not discover before finalizing the note.

## 2022-10-22 NOTE — LETTER
October 22, 2022         Patient: Emanuel Nascimento   YOB: 1985   Date of Visit: 10/22/2022           To Whom it May Concern:    Emanuel Nascimento was seen in my clinic on 10/22/2022.  If you have any questions or concerns, please don't hesitate to call.        Sincerely,           Luciano Hoskins M.D.  Electronically Signed

## 2022-11-04 ENCOUNTER — OFFICE VISIT (OUTPATIENT)
Dept: URGENT CARE | Facility: PHYSICIAN GROUP | Age: 37
End: 2022-11-04
Payer: OTHER GOVERNMENT

## 2022-11-04 VITALS
BODY MASS INDEX: 23.7 KG/M2 | HEIGHT: 72 IN | WEIGHT: 175 LBS | HEART RATE: 80 BPM | DIASTOLIC BLOOD PRESSURE: 82 MMHG | SYSTOLIC BLOOD PRESSURE: 128 MMHG | TEMPERATURE: 97.5 F | OXYGEN SATURATION: 96 % | RESPIRATION RATE: 16 BRPM

## 2022-11-04 DIAGNOSIS — K52.9 GASTROENTERITIS: ICD-10-CM

## 2022-11-04 DIAGNOSIS — R11.2 NAUSEA AND VOMITING, UNSPECIFIED VOMITING TYPE: ICD-10-CM

## 2022-11-04 PROCEDURE — 99213 OFFICE O/P EST LOW 20 MIN: CPT | Performed by: PHYSICIAN ASSISTANT

## 2022-11-04 RX ORDER — ONDANSETRON 4 MG/1
4 TABLET, ORALLY DISINTEGRATING ORAL EVERY 6 HOURS PRN
Qty: 15 TABLET | Refills: 0 | Status: SHIPPED | OUTPATIENT
Start: 2022-11-04 | End: 2023-02-08

## 2022-11-04 NOTE — LETTER
November 4, 2022    To Whom It May Concern:         This is confirmation that Emanuel Nascimento attended his scheduled appointment with Flavia Green P.A.-C. on 11/04/22.         If you have any questions please do not hesitate to call me at the phone number listed below.    Sincerely,          Flavia Green P.A.-C.  [phone]

## 2022-11-05 NOTE — PROGRESS NOTES
Subjective:   Emanuel Nascimento is a 37 y.o. male who presents for Nausea (X 3 days, loss of appetite)  Patient presents with chief complaint of nausea and loss of appetite x3 days  Patient developed low grade fever tues night which resolved, has had nausea since.  Vomiting 1-2x/day.  No bloody emesis.  No diarrhea.  No bloody stools minimal abdominal pain.  Might have eaten some  meat Tuesday night.  H/o appendectomy.   No bloody stool.  Nathan liquids.  Needs note to return to work.          Review of Systems   Gastrointestinal:  Positive for nausea.     Medications:  benzonatate Caps  ibuprofen Tabs    Allergies:             Patient has no known allergies.    Surgical History:       No past surgical history on file.    Past Social Hx:  Emanuel Nascimento  reports that he has been smoking cigarettes. He has been smoking an average of 1 pack per day. He has never used smokeless tobacco. He reports current alcohol use. He reports that he does not use drugs.     Past Family Hx:   Emanuel Nascimento family history is not on file.       Problem list, medications, and allergies reviewed by myself today in Epic.     Objective:     /82   Pulse 80   Temp 36.4 °C (97.5 °F)   Resp 16   Ht 1.829 m (6')   Wt 79.4 kg (175 lb)   SpO2 96%   BMI 23.73 kg/m²     Physical Exam  Vitals and nursing note reviewed.   Constitutional:       General: He is not in acute distress.     Appearance: Normal appearance. He is not ill-appearing or toxic-appearing.   HENT:      Mouth/Throat:      Mouth: Mucous membranes are moist.   Cardiovascular:      Rate and Rhythm: Normal rate.      Pulses: Normal pulses.      Heart sounds: Normal heart sounds.   Pulmonary:      Effort: Pulmonary effort is normal. No respiratory distress.      Breath sounds: Normal breath sounds. No wheezing.   Abdominal:      General: Abdomen is flat. Bowel sounds are normal. There is no distension.      Palpations: Abdomen is soft. There is no splenomegaly, mass or pulsatile  "mass.      Tenderness: There is generalized abdominal tenderness. There is no right CVA tenderness, left CVA tenderness, guarding or rebound. Negative signs include Abdi's sign, Rovsing's sign and McBurney's sign.      Hernia: No hernia is present.   Musculoskeletal:      Cervical back: No rigidity.   Neurological:      Mental Status: He is alert.       Assessment/Plan:     Diagnosis and Associated Orders:     1. Nausea and vomiting, unspecified vomiting type  - ondansetron (ZOFRAN ODT) 4 MG TABLET DISPERSIBLE; Take 1 Tablet by mouth every 6 hours as needed for Nausea for up to 15 doses.  Dispense: 15 Tablet; Refill: 0    2. Gastroenteritis  - ondansetron (ZOFRAN ODT) 4 MG TABLET DISPERSIBLE; Take 1 Tablet by mouth every 6 hours as needed for Nausea for up to 15 doses.  Dispense: 15 Tablet; Refill: 0      Comments/MDM:    Gastroenteritis   Gastroenteritis is an illness of the intestines. It is sometimes called \"stomach flu\". It causes nausea, vomiting, stomach cramps, watery poop (diarrhea) and a slight fever. It is usually caused by a virus. This illness often clears up in 4-5 days. However, it can be serious when people who lose too much fluid from throwing up (vomiting) and/or diarrhea. When too much fluid is lost and has not been replaced, it is called dehydration.   HOME CARE   Wash your hands a lot.   Rest.   Drink fluids slowly. Drinking too much or too fast can cause you to throw up.   Drink \"oral rehydration fluids\" (ORS) as directed. They can be bought in a grocery store or pharmacy. Ask your doctor or pharmacist how to take the ORS if you do not understand.   Do not eat too much at once.   Avoid tobacco, alcohol and drugs that upset your stomach.   BRAT diet start eating bananas, rice, apples and dry toast   GET HELP RIGHT AWAY IF:   You become weak, dizzy, or faint.   You cannot keep fluids down.   You have a dry mouth, no tears and pee less. These are signs of dehydration.   Belly (abdominal) pain " starts, feels worse, or stays in one place.   You or your child has a fever above 102º F (38.9º C) for more than 1 day.   Diarrhea has blood or mucous in it.   You become confused.   After 5-7 days, you are still throwing up and/or having diarrhea.   MAKE SURE YOU:   Understand these instructions.   Will watch your condition.   Will get help right away if you are not doing well or get worse.       I personally reviewed prior external notes and test results pertinent to today's visit.  Red flags discussed as well as indications to present to the Emergency Department.  Supportive care, natural history, differential diagnoses, and indications for immediate follow-up discussed.  Patient expresses understanding and agrees to plan.  Patient denies any other questions or concerns.    Follow-up with the primary care physician for recheck, reevaluation, and consideration of further management.      Please note that this dictation was created using voice recognition software. I have made a reasonable attempt to correct obvious errors, but I expect that there are errors of grammar and possibly content that I did not discover before finalizing the note.    This note was electronically signed by Flavia Green PA-C

## 2022-11-28 ENCOUNTER — OFFICE VISIT (OUTPATIENT)
Dept: URGENT CARE | Facility: PHYSICIAN GROUP | Age: 37
End: 2022-11-28
Payer: OTHER GOVERNMENT

## 2022-11-28 VITALS
HEART RATE: 104 BPM | WEIGHT: 180 LBS | DIASTOLIC BLOOD PRESSURE: 66 MMHG | OXYGEN SATURATION: 98 % | SYSTOLIC BLOOD PRESSURE: 96 MMHG | BODY MASS INDEX: 24.38 KG/M2 | RESPIRATION RATE: 16 BRPM | HEIGHT: 72 IN | TEMPERATURE: 98.7 F

## 2022-11-28 DIAGNOSIS — U07.1 COVID-19: ICD-10-CM

## 2022-11-28 LAB
EXTERNAL QUALITY CONTROL: ABNORMAL
FLUAV+FLUBV AG SPEC QL IA: NEGATIVE
INT CON NEG: NEGATIVE
INT CON NEG: NEGATIVE
INT CON POS: POSITIVE
INT CON POS: POSITIVE
SARS-COV+SARS-COV-2 AG RESP QL IA.RAPID: POSITIVE

## 2022-11-28 PROCEDURE — 99213 OFFICE O/P EST LOW 20 MIN: CPT | Mod: 25,CS | Performed by: NURSE PRACTITIONER

## 2022-11-28 PROCEDURE — 87804 INFLUENZA ASSAY W/OPTIC: CPT | Performed by: NURSE PRACTITIONER

## 2022-11-28 PROCEDURE — 87426 SARSCOV CORONAVIRUS AG IA: CPT | Performed by: NURSE PRACTITIONER

## 2022-11-28 NOTE — LETTER
November 28, 2022    Your employee was seen in our clinic today and had a POSITIVE Covid-19 test.  Symptom onset : 11/25/22    Since the results of testing are positive, your employee should follow the CDC guidelines.  These are isolation for a minimum of 5 days and at least 24 hours have passed since your last fever without the use of fever-reducing medications and all other symptoms have improved.  After completing the isolation the patient must continue to use a well fitting mask for an additional 5 days.  The health department may contact you and provide further directions regarding self-isolation and return to work.     This is the only note that will be provided from [facility] for this visit.  Your employee will require an appointment with a primary care provider if FMLA or disability forms are required.  If you have any questions please do not hesitate to call me at the phone number listed below.    Sincerely,    Linh Serra, [email or web address].  [phone]

## 2022-11-28 NOTE — PROGRESS NOTES
Patient has consented to treatment and for use of patient information for treatment and billing purposes.    Date: 11/28/22     Arrival Mode: Private Vehicle / Ambulatory    Chief Complaint:    Chief Complaint   Patient presents with    Fever     102  Onset 4 days          History of Present Illness: 37 y.o. male  presents to clinic with 4-day history of fever, significant body aches and nasal congestion ear fullness and cough.  Patient has also had mild diarrhea denies bloody or black.  No nausea or vomiting.  Patient denies severe shortness of breath, chest pain or lower leg swelling.  Patient presents to clinic for COVID-19 testing.  Patient has been using over-the-counter cold medications which she states is helpful.      ROS:  As stated in HPI     Pertinent Medical History:    No past medical history on file.     Pertinent Surgical History:    No past surgical history on file.     Current  Medications:  Current Outpatient Medications on File Prior to Visit   Medication Sig Dispense Refill    ondansetron (ZOFRAN ODT) 4 MG TABLET DISPERSIBLE Take 1 Tablet by mouth every 6 hours as needed for Nausea for up to 15 doses. (Patient not taking: Reported on 11/28/2022) 15 Tablet 0    benzonatate (TESSALON) 100 MG Cap Take 1 Capsule by mouth 3 times a day as needed for Cough. (Patient not taking: Reported on 10/22/2022) 30 Capsule 0    ibuprofen (MOTRIN) 600 MG Tab Take 1 Tab by mouth every 6 hours as needed for Mild Pain, Moderate Pain or Inflammation. (Patient not taking: Reported on 9/27/2022) 30 Tab 0     No current facility-administered medications on file prior to visit.        Allergies:     Patient has no known allergies.     Social History:   Social History     Socioeconomic History    Marital status: Single     Spouse name: Not on file    Number of children: Not on file    Years of education: Not on file    Highest education level: Not on file   Occupational History    Not on file   Tobacco Use    Smoking  status: Every Day     Packs/day: 1.00     Types: Cigarettes    Smokeless tobacco: Never   Vaping Use    Vaping Use: Never used   Substance and Sexual Activity    Alcohol use: Yes     Comment: Moderate    Drug use: No    Sexual activity: Yes     Partners: Female   Other Topics Concern    Not on file   Social History Narrative    Not on file     Social Determinants of Health     Financial Resource Strain: Not on file   Food Insecurity: Not on file   Transportation Needs: Not on file   Physical Activity: Not on file   Stress: Not on file   Social Connections: Not on file   Intimate Partner Violence: Not on file   Housing Stability: Not on file        No LMP for male patient.       Physical Exam:  Vitals:    11/28/22 1335   BP: (!) 96/66   Pulse: (!) 104   Resp: 16   Temp: 37.1 °C (98.7 °F)   SpO2: 98%        Physical Exam  Constitutional:       General: He is awake.      Appearance: Normal appearance. He is not ill-appearing, toxic-appearing or diaphoretic.   HENT:      Head: Normocephalic and atraumatic.      Right Ear: Tympanic membrane, ear canal and external ear normal.      Left Ear: Tympanic membrane, ear canal and external ear normal.      Nose: Rhinorrhea present. Rhinorrhea is clear.      Mouth/Throat:      Lips: Pink.      Mouth: Mucous membranes are moist.      Tongue: No lesions.      Palate: No lesions.      Pharynx: Posterior oropharyngeal erythema present. No oropharyngeal exudate or uvula swelling.      Tonsils: No tonsillar exudate or tonsillar abscesses.   Eyes:      General: Lids are normal. Gaze aligned appropriately. No allergic shiner or scleral icterus.     Extraocular Movements: Extraocular movements intact.      Conjunctiva/sclera: Conjunctivae normal.      Pupils: Pupils are equal, round, and reactive to light.   Cardiovascular:      Rate and Rhythm: Normal rate and regular rhythm.      Pulses:           Radial pulses are 2+ on the right side and 2+ on the left side.      Heart sounds: Normal  heart sounds.   Pulmonary:      Effort: Pulmonary effort is normal.      Breath sounds: Normal breath sounds and air entry. No decreased breath sounds, wheezing, rhonchi or rales.   Abdominal:      General: Abdomen is flat. Bowel sounds are normal.      Palpations: Abdomen is soft.      Tenderness: There is no abdominal tenderness.   Musculoskeletal:      Right lower leg: No edema.      Left lower leg: No edema.   Lymphadenopathy:      Cervical: No cervical adenopathy.   Skin:     General: Skin is warm.      Capillary Refill: Capillary refill takes less than 2 seconds.      Coloration: Skin is not cyanotic or pale.   Neurological:      Mental Status: He is alert and oriented to person, place, and time.      Gait: Gait is intact.   Psychiatric:         Behavior: Behavior normal. Behavior is cooperative.        Diagnostics:    POCT influenza is negative   POCT COVID is positive.          Medical Decision Making:  I personally reviewed prior external notes and test results pertinent to today's visit.   Shared decision-making was utilized with patient did develop treatment plan and clinic course. Pleasant, 37 y.o. male presenting clinic with viral URI-like symptoms.  Fortunately patient is nontoxic-appearing on exam and lung sounds are clear as well as vital signs are reassuring mildly tachycardic.  Tachycardia does show systemic symptoms.  POCT influenza was negative although POCT COVID was positive.  Did discuss isolation and will provide a work note as requested from patient.    Did advise patient on conservative measures for management of symptoms.  Patient is agreeable to pursue adequate rest, adequate hydration, saltwater gargle and Neti pot for any symptoms of upper respiratory congestion.  Over-the-counter analgesia and antipyretics on a p.r.n. basis as needed for pain and fever.  Did discuss over-the-counter cough medications.      Patient will monitor symptoms closely for worsening and is advised to seek  further evaluation the emergency room if alarm signs or symptoms arise.  Patient states understanding and verbalizes agreement with this plan of care.    Plan:    1. COVID-19    - POCT SARS-COV Antigen LEONARDA (Symptomatic only)  - POCT Influenza A/B         Disposition:  Patient was discharged in stable condition.    Voice Recognition Disclaimer: Portions of this document were created using voice recognition software. The software does have a chance of producing errors of grammar and possibly content. I have made every reasonable attempt to correct obvious errors, but there may be errors of grammar and possibly content that I did not discover before finalizing the documentation.    TAMIKO Armando.

## 2022-12-02 ENCOUNTER — OFFICE VISIT (OUTPATIENT)
Dept: URGENT CARE | Facility: PHYSICIAN GROUP | Age: 37
End: 2022-12-02
Payer: OTHER GOVERNMENT

## 2022-12-02 VITALS
HEART RATE: 104 BPM | HEIGHT: 72 IN | RESPIRATION RATE: 18 BRPM | BODY MASS INDEX: 24.38 KG/M2 | DIASTOLIC BLOOD PRESSURE: 84 MMHG | TEMPERATURE: 98 F | WEIGHT: 180 LBS | SYSTOLIC BLOOD PRESSURE: 122 MMHG | OXYGEN SATURATION: 100 %

## 2022-12-02 DIAGNOSIS — R42 DIZZINESS: ICD-10-CM

## 2022-12-02 PROCEDURE — 99213 OFFICE O/P EST LOW 20 MIN: CPT | Performed by: FAMILY MEDICINE

## 2022-12-02 RX ORDER — MECLIZINE HYDROCHLORIDE 25 MG/1
25 TABLET ORAL EVERY 8 HOURS PRN
Qty: 30 TABLET | Refills: 0 | Status: SHIPPED | OUTPATIENT
Start: 2022-12-02 | End: 2023-02-08

## 2022-12-02 NOTE — PROGRESS NOTES
"Chief Complaint   Patient presents with    Dizziness     COVID + Monday, vertigo      Chief Complaint   Patient presents with    Dizziness     COVID + Monday, vertigo        Dx with covid on 11/28      Reports fever, nasal congestion resolved.     Just has persistent dizziness x 4 d     Feels \"off balance\" but denies emesis or true vertigo.   Feels slightly improved.        Symptoms are unchanged since onset.   Symptoms are intermittent and describes feeling of dysequilibrium, unsteady on feet.   Worse with  getting up from supine position.    No double vision, hearing loss, numbness, nausea, vomiting, headache, slurred speech or shortness of breath.   Denies depression, anxiety.    No past medical history on file.    Social History     Tobacco Use    Smoking status: Every Day     Packs/day: 1.00     Types: Cigarettes    Smokeless tobacco: Never   Vaping Use    Vaping Use: Never used   Substance Use Topics    Alcohol use: Yes     Comment: Moderate    Drug use: No            Review of Systems   Constitutional: Negative for fever.   Respiratory: Negative for shortness of breath.    Cardiovascular: Negative for chest pain.   GI - no diarrhea  Neurological: Positive for dizziness. Negative for headaches.   All other systems reviewed and are negative.         Objective:     /84   Pulse (!) 104   Temp 36.7 °C (98 °F)   Resp 18   Ht 1.829 m (6')   Wt 81.6 kg (180 lb)   SpO2 100%       Physical Exam   Constitutional: She is oriented to person, place, and time. She appears well-developed and well-nourished. No distress.   HENT:   Head: Normocephalic and atraumatic. EOMI.  PERRLA.  No nystagmus  Mouth/Throat: No oropharyngeal exudate.   TMs normal   Eyes: Conjunctivae are normal.   Cardiovascular: Normal rate, regular rhythm and normal heart sounds.    Pulmonary/Chest: Effort normal and breath sounds normal. No respiratory distress. Pt has no wheezes, rales.   Neurological: pt is alert and oriented to person, " place, and time. No cranial nerve deficit. Coordination and gait normal.   Skin: Skin is warm.  he is not diaphoretic. No erythema.   Psychiatric:  behavior is normal.   Nursing note and vitals reviewed.              Assessment/Plan:     1. BPPV (benign paroxysmal positional vertigo), unspecified laterality     - meclizine (ANTIVERT) 25 MG Tab; Take 1 Tab by mouth 3 times a day as needed.  Dispense: 30 Tab; Refill: 0        Differential diagnosis, natural history, supportive care, and indications for immediate follow-up discussed. All questions answered. Patient agrees with the plan of care.     Follow-up as needed if symptoms worsen or fail to improve to PCP, Urgent care or Emergency Room.     I have personally reviewed prior external notes and test results pertinent to today's visit.  I have independently reviewed and interpreted all diagnostics ordered during this urgent care acute visit.

## 2023-01-30 ENCOUNTER — OFFICE VISIT (OUTPATIENT)
Dept: URGENT CARE | Facility: PHYSICIAN GROUP | Age: 38
End: 2023-01-30
Payer: OTHER GOVERNMENT

## 2023-01-30 VITALS
SYSTOLIC BLOOD PRESSURE: 136 MMHG | DIASTOLIC BLOOD PRESSURE: 88 MMHG | HEART RATE: 85 BPM | TEMPERATURE: 98.9 F | WEIGHT: 192 LBS | HEIGHT: 72 IN | BODY MASS INDEX: 26.01 KG/M2 | RESPIRATION RATE: 14 BRPM | OXYGEN SATURATION: 97 %

## 2023-01-30 DIAGNOSIS — M25.561 CHRONIC PAIN OF RIGHT KNEE: ICD-10-CM

## 2023-01-30 DIAGNOSIS — G89.29 CHRONIC PAIN OF RIGHT KNEE: ICD-10-CM

## 2023-01-30 DIAGNOSIS — A08.4 VIRAL GASTROENTERITIS: ICD-10-CM

## 2023-01-30 DIAGNOSIS — Z76.89 ENCOUNTER TO ESTABLISH CARE: ICD-10-CM

## 2023-01-30 PROCEDURE — 99214 OFFICE O/P EST MOD 30 MIN: CPT | Performed by: NURSE PRACTITIONER

## 2023-01-30 ASSESSMENT — ENCOUNTER SYMPTOMS
EYES NEGATIVE: 1
RESPIRATORY NEGATIVE: 1
CARDIOVASCULAR NEGATIVE: 1
FEVER: 0
CONSTITUTIONAL NEGATIVE: 1

## 2023-01-31 ENCOUNTER — TELEPHONE (OUTPATIENT)
Dept: HEALTH INFORMATION MANAGEMENT | Facility: OTHER | Age: 38
End: 2023-01-31

## 2023-01-31 ASSESSMENT — ENCOUNTER SYMPTOMS: DIARRHEA: 1

## 2023-01-31 NOTE — TELEPHONE ENCOUNTER
OUTCOME: LEFT MESSAGE FOR PT TO SCHD AN APPT WITH A PCP.     PLEASE TRANSFER TO [facility] [phone] WHEN PT RETURNS CALL.     ATTEMPT # 1.

## 2023-01-31 NOTE — PROGRESS NOTES
Subjective:   Emanuel Nascimento is a 37 y.o. male who presents for Diarrhea (X 2 days ) and Nausea      Patient presents with recent onset of abdominal pain and diarrhea x2 days.  He also has had associated fever.  Patient does report near resolution of symptoms over the past couple of days.  He did have to take time off work, and does need a work note today.  Additionally, patient has some chronic right knee pain for which he would like to be referred back to see see Dr. Seth, whom he has an established relationship with already.  Otherwise, patient has no significant complaints today.    Diarrhea   This is a new problem. Episode onset: 2 days. The problem occurs 2 to 4 times per day. The problem has been resolved. The stool consistency is described as Watery. The patient states that diarrhea does not awaken him from sleep. Pertinent negatives include no fever. Nothing aggravates the symptoms. There are no known risk factors. He has tried anti-motility drug and increased fluids for the symptoms. The treatment provided mild relief.     Review of Systems   Constitutional: Negative.  Negative for fever.   HENT: Negative.     Eyes: Negative.    Respiratory: Negative.     Cardiovascular: Negative.    Gastrointestinal:  Positive for diarrhea.   Skin: Negative.      Medications, Allergies, and current problem list reviewed today in Epic.     Objective:     /88 (BP Location: Right arm, Patient Position: Sitting, BP Cuff Size: Adult)   Pulse 85   Temp 37.2 °C (98.9 °F) (Temporal)   Resp 14   Ht 1.829 m (6')   Wt 87.1 kg (192 lb)   SpO2 97%     Physical Exam  Vitals reviewed.   Constitutional:       Appearance: Normal appearance.   HENT:      Head: Normocephalic and atraumatic.      Nose: Nose normal.      Mouth/Throat:      Mouth: Mucous membranes are moist.      Pharynx: Oropharynx is clear.   Eyes:      Extraocular Movements: Extraocular movements intact.      Conjunctiva/sclera: Conjunctivae normal.       Pupils: Pupils are equal, round, and reactive to light.   Cardiovascular:      Rate and Rhythm: Normal rate and regular rhythm.      Pulses: Normal pulses.      Heart sounds: Normal heart sounds.   Pulmonary:      Effort: Pulmonary effort is normal.      Breath sounds: Normal breath sounds.   Abdominal:      General: Abdomen is flat. Bowel sounds are normal.      Palpations: Abdomen is soft.   Musculoskeletal:         General: Normal range of motion.      Cervical back: Normal range of motion and neck supple.   Skin:     General: Skin is warm and dry.      Capillary Refill: Capillary refill takes less than 2 seconds.   Neurological:      General: No focal deficit present.      Mental Status: He is alert and oriented to person, place, and time.   Psychiatric:         Mood and Affect: Mood normal.         Behavior: Behavior normal.       Assessment/Plan:     Diagnosis and associated orders:     1. Viral gastroenteritis        2. Chronic pain of right knee  Referral to Sports Medicine    CANCELED: Referral to Sports Medicine      3. Encounter to establish care  Referral to establish with [facility]         Comments/MDM:     Patient's symptoms of gastroenteritis have resolved as of today's visit.  He will continue to remain hydrated with electrolytes and follow up as needed for this issue. He was given a note for work today.  For his known right knee pain, he was referred back Dr. Seth for ongoing management of his knee pain.    Patient was also referred to establish care with PCP today.          Differential diagnosis, natural history, supportive care, and indications for immediate follow-up discussed.    Advised the patient to follow-up with the primary care physician for recheck, reevaluation, and consideration of further management.    Please note that this dictation was created using voice recognition software. I have made a reasonable attempt to correct obvious errors, but I expect that there are errors of  grammar and possibly content that I did not discover before finalizing the note.    This note was electronically signed by RUBÉN Ng

## 2023-02-08 ENCOUNTER — HOSPITAL ENCOUNTER (OUTPATIENT)
Dept: RADIOLOGY | Facility: MEDICAL CENTER | Age: 38
End: 2023-02-08
Attending: FAMILY MEDICINE
Payer: OTHER GOVERNMENT

## 2023-02-08 ENCOUNTER — OFFICE VISIT (OUTPATIENT)
Dept: SPORTS MEDICINE | Facility: CLINIC | Age: 38
End: 2023-02-08
Payer: OTHER GOVERNMENT

## 2023-02-08 VITALS
HEIGHT: 72 IN | TEMPERATURE: 98.9 F | RESPIRATION RATE: 18 BRPM | DIASTOLIC BLOOD PRESSURE: 68 MMHG | WEIGHT: 192 LBS | OXYGEN SATURATION: 96 % | HEART RATE: 87 BPM | SYSTOLIC BLOOD PRESSURE: 118 MMHG | BODY MASS INDEX: 26.01 KG/M2

## 2023-02-08 DIAGNOSIS — G89.29 CHRONIC PAIN OF RIGHT KNEE: ICD-10-CM

## 2023-02-08 DIAGNOSIS — M25.561 CHRONIC PAIN OF RIGHT KNEE: ICD-10-CM

## 2023-02-08 PROCEDURE — 99213 OFFICE O/P EST LOW 20 MIN: CPT | Performed by: FAMILY MEDICINE

## 2023-02-08 PROCEDURE — 73564 X-RAY EXAM KNEE 4 OR MORE: CPT | Mod: RT

## 2023-02-08 ASSESSMENT — ENCOUNTER SYMPTOMS: FEVER: 0

## 2023-02-08 NOTE — LETTER
February 8, 2023    To Whom It May Concern:         This is confirmation that Emanuel Nascimento attended his scheduled appointment with Saran Cooper M.D. on 2/08/23.  Further evaluation of his knee is being done.  For now, he has been advised to avoid any running/jogging/fast walking.  He is unable to do any strenuous exercise that would involve his right knee.  He has no other restrictions.  These restrictions will last until 3/8/2023.  Thank you for making accommodations as he recovers.         If you have any questions please do not hesitate to call me at the phone number listed below.    Sincerely,          Saran Cooper M.D.  [phone]

## 2023-02-08 NOTE — PROGRESS NOTES
Subjective:     Emanuel Nascimento is a 37 y.o. male who presents for Knee Problem (Right knee follow up )    HPI  Pt presents for evaluation of right knee pain   Pain evaluated about 3 years ago and referred to physical therapy  Completed course of PT with some improvement  Pain has been bothering him more the past few months   Pain is worse when he keeps his knee bent for long time   Has some dull aching pain on the lateral knee and some sharper pain in the medial knee  Pain also bothers him quite a bit when sitting on a ski lift with his leg hanging  Notices pain when ambulating as he pivots or twists, but is able to walk in a straight line with minimal pain  No recent falls or injuries    Review of Systems   Constitutional:  Negative for fever.     PMH:  has no past medical history on file.  MEDS: No current outpatient medications on file.  ALLERGIES: No Known Allergies  SURGHX: History reviewed. No pertinent surgical history.  SOCHX:  reports that he has been smoking cigarettes. He has been smoking an average of 1 pack per day. He has never used smokeless tobacco. He reports current alcohol use. He reports that he does not use drugs.     Objective:   /68 (BP Location: Right arm, Patient Position: Sitting, BP Cuff Size: Adult)   Pulse 87   Temp 37.2 °C (98.9 °F) (Temporal)   Resp 18   Ht 1.829 m (6')   Wt 87.1 kg (192 lb)   SpO2 96%   BMI 26.04 kg/m²     Physical Exam  Constitutional:       General: He is not in acute distress.     Appearance: He is well-developed. He is not diaphoretic.   Pulmonary:      Effort: Pulmonary effort is normal.   Neurological:      Mental Status: He is alert.   Right knee  Appearance - No bruising, erythema, or deformity appreciated  Palpation - +Mild medial joint line tenderness, no bony tenderness throughout  ROM - FROM without crepitus  Strength - 5/5 throughout  Neuro - Sensation equal and intact bilaterally  Special testing - No laxity or pain with varus/valgus stress,  neg anterior drawer, neg posterior drawer, neg Lachman's, Lazaro's reproduces pain with no catching/locking, neg patellar apprehension test    Assessment/Plan:   Assessment    1. Chronic pain of right knee  - DX-KNEE COMPLETE 4+ RIGHT; Future    Patient with chronic knee pain.  X-ray does not show acute fracture.  Did make some improvements with physical therapy, but never fully resolved.  Continues to have pain which bothers him and prevents him from his duties in the .  Reviewed the risks and benefits of continued supportive care and conservative management versus further imaging to pursue surgically fixable pathology.  His current symptoms are consistent with meniscus tear which is not resolving with conservative management.  He has been treated appropriately thus far and it is reasonable to consider pursuit of surgical intervention.  Will obtain MRI to further evaluate what surgical options may be available.

## 2023-02-14 ENCOUNTER — OFFICE VISIT (OUTPATIENT)
Dept: MEDICAL GROUP | Facility: PHYSICIAN GROUP | Age: 38
End: 2023-02-14
Attending: NURSE PRACTITIONER
Payer: OTHER GOVERNMENT

## 2023-02-14 VITALS
DIASTOLIC BLOOD PRESSURE: 72 MMHG | OXYGEN SATURATION: 95 % | RESPIRATION RATE: 18 BRPM | WEIGHT: 190 LBS | SYSTOLIC BLOOD PRESSURE: 108 MMHG | TEMPERATURE: 98 F | HEART RATE: 95 BPM | BODY MASS INDEX: 25.73 KG/M2 | HEIGHT: 72 IN

## 2023-02-14 DIAGNOSIS — Z13.228 SCREENING FOR ENDOCRINE, METABOLIC AND IMMUNITY DISORDER: ICD-10-CM

## 2023-02-14 DIAGNOSIS — M25.561 CHRONIC PAIN OF RIGHT KNEE: ICD-10-CM

## 2023-02-14 DIAGNOSIS — Z11.59 NEED FOR HEPATITIS C SCREENING TEST: ICD-10-CM

## 2023-02-14 DIAGNOSIS — G89.29 CHRONIC PAIN OF RIGHT KNEE: ICD-10-CM

## 2023-02-14 DIAGNOSIS — Z13.0 SCREENING FOR ENDOCRINE, METABOLIC AND IMMUNITY DISORDER: ICD-10-CM

## 2023-02-14 DIAGNOSIS — Z13.29 SCREENING FOR ENDOCRINE, METABOLIC AND IMMUNITY DISORDER: ICD-10-CM

## 2023-02-14 DIAGNOSIS — F17.210 CIGARETTE SMOKER: ICD-10-CM

## 2023-02-14 DIAGNOSIS — M25.312 INSTABILITY OF BOTH SHOULDER JOINTS: ICD-10-CM

## 2023-02-14 DIAGNOSIS — M25.311 INSTABILITY OF BOTH SHOULDER JOINTS: ICD-10-CM

## 2023-02-14 PROCEDURE — 99406 BEHAV CHNG SMOKING 3-10 MIN: CPT | Performed by: PHYSICIAN ASSISTANT

## 2023-02-14 PROCEDURE — 99214 OFFICE O/P EST MOD 30 MIN: CPT | Mod: 25 | Performed by: PHYSICIAN ASSISTANT

## 2023-02-14 ASSESSMENT — PATIENT HEALTH QUESTIONNAIRE - PHQ9: CLINICAL INTERPRETATION OF PHQ2 SCORE: 0

## 2023-02-14 NOTE — PROGRESS NOTES
Subjective:     CC: Establish care, chronic pain of right knee    HPI:   Emanuel presents today to establish care with primary care.  He does get some of his care through the  as well.  Currently seeing Dr. Cooper for ongoing knee pain.  Also having issues with shoulder instability.    History reviewed. No pertinent past medical history.    Social History     Tobacco Use    Smoking status: Every Day     Packs/day: 1.00     Years: 15.00     Pack years: 15.00     Types: Cigarettes    Smokeless tobacco: Never   Vaping Use    Vaping Use: Never used   Substance Use Topics    Alcohol use: Yes     Comment: Moderate about 2 drinks a day for 5 days    Drug use: No       No current [facility]-ordered outpatient medications on file.     No current [facility]-ordered facility-administered medications on file.       Allergies:  Patient has no known allergies.    Health Maintenance: Completed    ROS:  Gen: no fevers/chills  Eyes: no changes in vision  ENT: no sore throat  Pulm: no sob, no cough  CV: no chest pain  GI: no nausea/vomiting  : no dysuria  MSk: Positive for knee pain  Skin: no rash  Neuro: no headaches  Psych: no depression, no anxiety    Objective:     Exam:  /72   Pulse 95   Temp 36.7 °C (98 °F) (Temporal)   Resp 18   Ht 1.829 m (6')   Wt 86.2 kg (190 lb)   SpO2 95%   BMI 25.77 kg/m²  Body mass index is 25.77 kg/m².    Gen: Alert and oriented, No apparent distress.  Skin: Warm, dry, good turgor, no rashes in visible areas.  HEENT: Normocephalic. Eyes conjunctiva clear lids without ptosis, pupils equal and reactive to light accommodation  Neck: Trachea midline, no masses, no thyromegaly  Lungs: Normal effort, CTA bilaterally, no wheezes, rhonchi, or rales  CV: Regular rate and rhythm. No murmurs, rubs, or gallops.  MSK: Normal gait, moves all extremities.  Neuro: Grossly non-focal.  Ext: No clubbing, cyanosis, edema.  Psych: Alert and oriented x3, normal affect and mood.     Labs: No recent labs to  review    Assessment & Plan:     37 y.o. male with the following -     1. Cigarette smoker  Patient is advised to stop using tobacco and tobacco products. We discussed abrupt cessation, nicotine gum or patches, medications such as bupropion or Chantix, and nicotine inhalers including electronic cigarettes.  Approximately 5 minutes was spent with the patient discussing related health consequences of tobacco use, nonpharmacologic and pharmacologic treatment modalities.  Patient is not ready to quit smoking.  We will continue to monitor.    2. Chronic pain of right knee  Chronic, managed by Dr. Cooper.  Currently in the process of waiting for an MRI.  We will continue to monitor.    3. Instability of both shoulder joints  Chronic.  Patient has been told in the past that he needs a shoulder replacement, however he has waited on getting this completed.  Already established with orthopedics.    4. Need for hepatitis C screening test  - HEP C VIRUS ANTIBODY; Future    5. Screening for endocrine, metabolic and immunity disorder  - Comp Metabolic Panel; Future  - VITAMIN D,25 HYDROXY (DEFICIENCY); Future  - Lipid Profile; Future  - HEMOGLOBIN A1C; Future  - CBC WITHOUT DIFFERENTIAL; Future    I spent a total of 30 minutes with record review (including external notes and labs), exam, communication with the patient, communication with other providers, and documentation of this encounter.     Return for follow up labs.    Please note that this dictation was created using voice recognition software. I have made every reasonable attempt to correct obvious errors, but I expect that there are errors of grammar and possibly content that I did not discover before finalizing the note.    Electronically signed by Niecy Middleton PA-C on February 14, 2023

## 2023-02-23 ENCOUNTER — APPOINTMENT (OUTPATIENT)
Dept: RADIOLOGY | Facility: MEDICAL CENTER | Age: 38
End: 2023-02-23
Attending: FAMILY MEDICINE
Payer: OTHER GOVERNMENT

## 2023-02-23 ENCOUNTER — HOSPITAL ENCOUNTER (OUTPATIENT)
Dept: LAB | Facility: MEDICAL CENTER | Age: 38
End: 2023-02-23
Attending: PHYSICIAN ASSISTANT
Payer: OTHER GOVERNMENT

## 2023-02-23 DIAGNOSIS — Z11.59 NEED FOR HEPATITIS C SCREENING TEST: ICD-10-CM

## 2023-02-23 DIAGNOSIS — Z13.0 SCREENING FOR ENDOCRINE, METABOLIC AND IMMUNITY DISORDER: ICD-10-CM

## 2023-02-23 DIAGNOSIS — Z13.228 SCREENING FOR ENDOCRINE, METABOLIC AND IMMUNITY DISORDER: ICD-10-CM

## 2023-02-23 DIAGNOSIS — G89.29 CHRONIC PAIN OF RIGHT KNEE: ICD-10-CM

## 2023-02-23 DIAGNOSIS — M25.561 CHRONIC PAIN OF RIGHT KNEE: ICD-10-CM

## 2023-02-23 DIAGNOSIS — Z13.29 SCREENING FOR ENDOCRINE, METABOLIC AND IMMUNITY DISORDER: ICD-10-CM

## 2023-02-23 LAB
ALBUMIN SERPL BCP-MCNC: 4.9 G/DL (ref 3.2–4.9)
ALBUMIN/GLOB SERPL: 1.8 G/DL
ALP SERPL-CCNC: 71 U/L (ref 30–99)
ALT SERPL-CCNC: 63 U/L (ref 2–50)
ANION GAP SERPL CALC-SCNC: 14 MMOL/L (ref 7–16)
AST SERPL-CCNC: 44 U/L (ref 12–45)
BILIRUB SERPL-MCNC: 0.4 MG/DL (ref 0.1–1.5)
BUN SERPL-MCNC: 7 MG/DL (ref 8–22)
CALCIUM ALBUM COR SERPL-MCNC: 9.1 MG/DL (ref 8.5–10.5)
CALCIUM SERPL-MCNC: 9.8 MG/DL (ref 8.5–10.5)
CHLORIDE SERPL-SCNC: 103 MMOL/L (ref 96–112)
CHOLEST SERPL-MCNC: 233 MG/DL (ref 100–199)
CO2 SERPL-SCNC: 23 MMOL/L (ref 20–33)
CREAT SERPL-MCNC: 0.67 MG/DL (ref 0.5–1.4)
ERYTHROCYTE [DISTWIDTH] IN BLOOD BY AUTOMATED COUNT: 44.6 FL (ref 35.9–50)
FASTING STATUS PATIENT QL REPORTED: NORMAL
GFR SERPLBLD CREATININE-BSD FMLA CKD-EPI: 122 ML/MIN/1.73 M 2
GLOBULIN SER CALC-MCNC: 2.8 G/DL (ref 1.9–3.5)
GLUCOSE SERPL-MCNC: 86 MG/DL (ref 65–99)
HCT VFR BLD AUTO: 45.5 % (ref 42–52)
HDLC SERPL-MCNC: 37 MG/DL
HGB BLD-MCNC: 15.9 G/DL (ref 14–18)
LDLC SERPL CALC-MCNC: 127 MG/DL
MCH RBC QN AUTO: 32.9 PG (ref 27–33)
MCHC RBC AUTO-ENTMCNC: 34.9 G/DL (ref 33.7–35.3)
MCV RBC AUTO: 94 FL (ref 81.4–97.8)
PLATELET # BLD AUTO: 224 K/UL (ref 164–446)
PMV BLD AUTO: 10.6 FL (ref 9–12.9)
POTASSIUM SERPL-SCNC: 4 MMOL/L (ref 3.6–5.5)
PROT SERPL-MCNC: 7.7 G/DL (ref 6–8.2)
RBC # BLD AUTO: 4.84 M/UL (ref 4.7–6.1)
SODIUM SERPL-SCNC: 140 MMOL/L (ref 135–145)
TRIGL SERPL-MCNC: 343 MG/DL (ref 0–149)
WBC # BLD AUTO: 9.1 K/UL (ref 4.8–10.8)

## 2023-02-23 PROCEDURE — 80061 LIPID PANEL: CPT

## 2023-02-23 PROCEDURE — 86803 HEPATITIS C AB TEST: CPT

## 2023-02-23 PROCEDURE — 85027 COMPLETE CBC AUTOMATED: CPT

## 2023-02-23 PROCEDURE — 36415 COLL VENOUS BLD VENIPUNCTURE: CPT

## 2023-02-23 PROCEDURE — 80053 COMPREHEN METABOLIC PANEL: CPT

## 2023-02-23 PROCEDURE — 82306 VITAMIN D 25 HYDROXY: CPT

## 2023-02-23 PROCEDURE — 73721 MRI JNT OF LWR EXTRE W/O DYE: CPT | Mod: RT

## 2023-02-23 PROCEDURE — 83036 HEMOGLOBIN GLYCOSYLATED A1C: CPT

## 2023-02-24 LAB
25(OH)D3 SERPL-MCNC: 38 NG/ML (ref 30–100)
EST. AVERAGE GLUCOSE BLD GHB EST-MCNC: 105 MG/DL
HBA1C MFR BLD: 5.3 % (ref 4–5.6)
HCV AB SER QL: NORMAL

## 2023-03-15 ENCOUNTER — OFFICE VISIT (OUTPATIENT)
Dept: URGENT CARE | Facility: PHYSICIAN GROUP | Age: 38
End: 2023-03-15
Payer: OTHER GOVERNMENT

## 2023-03-15 VITALS
DIASTOLIC BLOOD PRESSURE: 72 MMHG | SYSTOLIC BLOOD PRESSURE: 110 MMHG | HEART RATE: 93 BPM | OXYGEN SATURATION: 97 % | RESPIRATION RATE: 16 BRPM | BODY MASS INDEX: 25.44 KG/M2 | WEIGHT: 187.83 LBS | TEMPERATURE: 98.8 F | HEIGHT: 72 IN

## 2023-03-15 DIAGNOSIS — R19.7 DIARRHEA, UNSPECIFIED TYPE: ICD-10-CM

## 2023-03-15 DIAGNOSIS — J06.9 VIRAL UPPER RESPIRATORY ILLNESS: ICD-10-CM

## 2023-03-15 PROCEDURE — 99213 OFFICE O/P EST LOW 20 MIN: CPT | Performed by: PHYSICIAN ASSISTANT

## 2023-03-15 ASSESSMENT — ENCOUNTER SYMPTOMS
CONSTIPATION: 0
DIZZINESS: 1
DIARRHEA: 1
SINUS PAIN: 0
EYE REDNESS: 0
COUGH: 0
SHORTNESS OF BREATH: 0
NAUSEA: 0
EYE PAIN: 0
CHILLS: 0
SORE THROAT: 0
FEVER: 0
EYE DISCHARGE: 0
HEADACHES: 0
WHEEZING: 0
ABDOMINAL PAIN: 0
VOMITING: 0
DIAPHORESIS: 0

## 2023-03-15 ASSESSMENT — FIBROSIS 4 INDEX: FIB4 SCORE: 0.94

## 2023-03-15 NOTE — PROGRESS NOTES
Subjective:     Emanuel Nascimento  is a 37 y.o. male who presents for Fatigue (Diarrhea and dizyy ness x 3 days . Slight congestion )       He presents today with generalized fatigue, diarrhea and dizziness x3 days.  Overall patient states that he does not feel super sick but is presenting today for a work note as he has missed work over the past few days.  There is associated mild sinus congestion.  Is having diarrhea 2-3 times per day.  Episodic nausea but no vomiting.  States that his generalized fatigue and dizziness became slightly worse today.  No loss of consciousness, no chest pain or shortness of breath.  Has been using over-the-counter medications for his symptoms.  Has been consuming a bland diet and electrolyte beverages for food and fluid intake.     Review of Systems   Constitutional:  Positive for malaise/fatigue. Negative for chills, diaphoresis and fever.   HENT:  Positive for congestion. Negative for ear discharge, sinus pain and sore throat.    Eyes:  Negative for pain, discharge and redness.   Respiratory:  Negative for cough, shortness of breath and wheezing.    Cardiovascular:  Negative for chest pain.   Gastrointestinal:  Positive for diarrhea. Negative for abdominal pain, constipation, nausea and vomiting.   Genitourinary:  Negative for dysuria, frequency and urgency.   Neurological:  Positive for dizziness. Negative for headaches.    No Known Allergies  History reviewed. No pertinent past medical history.     Objective:   /72 (BP Location: Right arm, Patient Position: Sitting, BP Cuff Size: Adult)   Pulse 93   Temp 37.1 °C (98.8 °F) (Temporal)   Resp 16   Ht 1.829 m (6')   Wt 85.2 kg (187 lb 13.3 oz)   SpO2 97%   BMI 25.47 kg/m²   Physical Exam  Vitals and nursing note reviewed.   Constitutional:       General: He is not in acute distress.     Appearance: Normal appearance. He is not ill-appearing, toxic-appearing or diaphoretic.   HENT:      Head: Normocephalic.      Right  Ear: Tympanic membrane, ear canal and external ear normal. There is no impacted cerumen.      Left Ear: Tympanic membrane, ear canal and external ear normal. There is no impacted cerumen.      Nose: No congestion or rhinorrhea.      Mouth/Throat:      Mouth: Mucous membranes are moist.      Pharynx: No oropharyngeal exudate or posterior oropharyngeal erythema.   Eyes:      General:         Right eye: No discharge.         Left eye: No discharge.      Conjunctiva/sclera: Conjunctivae normal.   Cardiovascular:      Rate and Rhythm: Normal rate and regular rhythm.   Pulmonary:      Effort: Pulmonary effort is normal. No respiratory distress.      Breath sounds: Normal breath sounds. No stridor. No wheezing or rhonchi.   Abdominal:      General: Abdomen is flat. Bowel sounds are normal. There is no distension.      Palpations: Abdomen is soft.      Tenderness: There is no abdominal tenderness. There is no guarding or rebound.   Musculoskeletal:      Cervical back: Neck supple.   Lymphadenopathy:      Cervical: No cervical adenopathy.   Neurological:      General: No focal deficit present.      Mental Status: He is alert and oriented to person, place, and time.   Psychiatric:         Mood and Affect: Mood normal.         Behavior: Behavior normal.         Thought Content: Thought content normal.         Judgment: Judgment normal.           Diagnostic testing: None    Assessment/Plan:     Encounter Diagnoses   Name Primary?    Diarrhea, unspecified type     Viral upper respiratory illness           Plan for care for today's complaint includes discussion of over-the-counter medications that may be beneficial for symptoms, discussion of appropriate p.o. hydration techniques.  No evidence of acute dehydration on exam today.  Work note was provided.  Continue to monitor symptoms and return to urgent care or follow-up with primary care provider if symptoms remain ongoing.  Follow-up in the emergency department if symptoms  become severe, ER precautions discussed in office today..    See AVS Instructions below for written guidance provided to patient on after-visit management and care in addition to our verbal discussion during the visit.    Please note that this dictation was created using voice recognition software. I have attempted to correct all errors, but there may be sound-alike, spelling, grammar and possibly content errors that I did not discover before finalizing the note.    Rajat Brewer PA-C

## 2023-03-15 NOTE — LETTER
[facility] [address] [address]     March 15, 2023    Patient: Emanuel Nascimento   YOB: 1985   Date of Visit: 3/15/2023       To Whom It May Concern:    Emanuel Nascimento was seen and treated in our department on 3/15/2023.  Please excuse from work from 3/13/2023 - 3/15/2023, he will need to remain off work until he is afebrile for 24 hours at which point he can return to work    Sincerely,     Rajat Brewer P.A.-C.

## 2023-03-28 ENCOUNTER — OFFICE VISIT (OUTPATIENT)
Dept: URGENT CARE | Facility: PHYSICIAN GROUP | Age: 38
End: 2023-03-28
Payer: OTHER GOVERNMENT

## 2023-03-28 VITALS
OXYGEN SATURATION: 96 % | SYSTOLIC BLOOD PRESSURE: 120 MMHG | WEIGHT: 190 LBS | HEART RATE: 114 BPM | TEMPERATURE: 98.5 F | BODY MASS INDEX: 25.73 KG/M2 | RESPIRATION RATE: 16 BRPM | HEIGHT: 72 IN | DIASTOLIC BLOOD PRESSURE: 76 MMHG

## 2023-03-28 DIAGNOSIS — K52.9 GASTROENTERITIS: ICD-10-CM

## 2023-03-28 DIAGNOSIS — E86.0 MILD DEHYDRATION: ICD-10-CM

## 2023-03-28 PROCEDURE — 99214 OFFICE O/P EST MOD 30 MIN: CPT | Performed by: NURSE PRACTITIONER

## 2023-03-28 ASSESSMENT — FIBROSIS 4 INDEX: FIB4 SCORE: 0.94

## 2023-03-28 NOTE — LETTER
March 28, 2023       Patient: Emanuel Nascimento   YOB: 1985   Date of Visit: 3/28/2023         To Whom It May Concern:    In my medical opinion, I recommend that Emanuel Nascimento return to full duty, no restrictions on 03/30/23. Please excuse work absences.              Sincerely,          NITIN Rios.  Electronically Signed

## 2023-03-29 ENCOUNTER — OFFICE VISIT (OUTPATIENT)
Dept: SPORTS MEDICINE | Facility: CLINIC | Age: 38
End: 2023-03-29
Payer: OTHER GOVERNMENT

## 2023-03-29 VITALS
HEIGHT: 72 IN | HEART RATE: 106 BPM | WEIGHT: 190 LBS | DIASTOLIC BLOOD PRESSURE: 80 MMHG | SYSTOLIC BLOOD PRESSURE: 122 MMHG | BODY MASS INDEX: 25.73 KG/M2 | TEMPERATURE: 99 F | RESPIRATION RATE: 16 BRPM | OXYGEN SATURATION: 96 %

## 2023-03-29 DIAGNOSIS — M25.561 CHRONIC PAIN OF RIGHT KNEE: ICD-10-CM

## 2023-03-29 DIAGNOSIS — G89.29 CHRONIC PAIN OF RIGHT KNEE: ICD-10-CM

## 2023-03-29 PROCEDURE — 99213 OFFICE O/P EST LOW 20 MIN: CPT | Mod: 25 | Performed by: FAMILY MEDICINE

## 2023-03-29 PROCEDURE — 20610 DRAIN/INJ JOINT/BURSA W/O US: CPT | Mod: RT | Performed by: FAMILY MEDICINE

## 2023-03-29 RX ORDER — TRIAMCINOLONE ACETONIDE 40 MG/ML
40 INJECTION, SUSPENSION INTRA-ARTICULAR; INTRAMUSCULAR ONCE
Status: COMPLETED | OUTPATIENT
Start: 2023-03-29 | End: 2023-03-29

## 2023-03-29 RX ADMIN — TRIAMCINOLONE ACETONIDE 40 MG: 40 INJECTION, SUSPENSION INTRA-ARTICULAR; INTRAMUSCULAR at 14:32

## 2023-03-29 ASSESSMENT — FIBROSIS 4 INDEX: FIB4 SCORE: 0.94

## 2023-03-29 ASSESSMENT — ENCOUNTER SYMPTOMS: FEVER: 0

## 2023-03-29 NOTE — PROGRESS NOTES
Subjective:     Emanuel Nascimento is a 37 y.o. male who presents for Knee Pain (F/V R knee pain )    HPI  Pt presents for follow-up  Patient with chronic right knee pain  Has responded only somewhat to physical therapy  Recently had MRI of the knee which was within normal limits on 2/23/2023  Continues to have knee pain and wanting to discuss next steps  Pain is more in the anterior knee and lateral peripatellar area  Pain is worse when walking downstairs or when trying to jog/run  No recent falls or injuries    Review of Systems   Constitutional:  Negative for fever.   Skin:  Negative for rash.     PMH:  has no past medical history on file.  MEDS: No current outpatient medications on file.  ALLERGIES: No Known Allergies  SURGHX:   Past Surgical History:   Procedure Laterality Date    APPENDECTOMY       SOCHX:  reports that he has been smoking cigarettes. He has a 15.00 pack-year smoking history. He has never used smokeless tobacco. He reports current alcohol use. He reports that he does not use drugs.     Objective:   /80 (BP Location: Left arm, Patient Position: Sitting, BP Cuff Size: Adult)   Pulse (!) 106   Temp 37.2 °C (99 °F) (Temporal)   Resp 16   Ht 1.829 m (6')   Wt 86.2 kg (190 lb)   SpO2 96%   BMI 25.77 kg/m²     Physical Exam  Constitutional:       General: He is not in acute distress.     Appearance: He is well-developed. He is not diaphoretic.   Pulmonary:      Effort: Pulmonary effort is normal.   Neurological:      Mental Status: He is alert.       Right knee  Appearance - No bruising, erythema, or deformity appreciated  Palpation - +TTP along No tenderness to palpation along patellar tendon, hamstring tendons, or quads.  No palpable effusion.  ROM - FROM without crepitus  Strength - 5/5 throughout  Neuro - Sensation equal and intact bilaterally  Special testing - No laxity or pain with varus/valgus stress, neg patellar apprehension test    Knee injection   First, a verbal consent and a  verbal time-out were done, explaining the risks and benefits of the procedure. Then the patient was placed in a seated position.  Anterior lateral joint line portals were identified. The skin was cleaned with alcohol and the clean, no touch technique was used with a 25-gauge 1.5-inch needle. A combination of 5 mL of 1% lidocaine without epinephrine and 1 milliliter of Kenalog 40 mg/mL was injected into the right knee. The patient tolerated the procedure well and there were no immediate post injection complications. Hemostasis was then achieved with gentle pressure and a Band-Aid was placed over the lesion. Post injection instructions for range of motion, ice, activity modification, signs of infection, emergency precautions were discussed with the patient.    Assessment/Plan:   Assessment    1. Chronic pain of right knee  - triamcinolone acetonide (KENALOG-40) injection 40 mg    Patient with chronic pain of the right knee.  Given corticosteroid injection after discussing multiple other treatment options.  He has failed PT, has a brace, and MRI was within normal limits.  If injection is helpful, may repeat in the future.  If injection is not helpful, may need to consider hyaluronic acid injection versus referral for surgical opinion.  Patient agreeable with plan and will call office in about 2 weeks to report how he is doing after the injection.

## 2023-03-29 NOTE — LETTER
March 29, 2023    To Whom It May Concern:         This is confirmation that Emanuel Nascimento attended his scheduled appointment with Saran Cooper M.D. on 3/29/23.  He received a corticosteroid injection in his right knee to try to improve his function.  He has been recommended not to do any jogging/running until 4/17/2023.  Thank you for making accommodations as he recovers.         If you have any questions please do not hesitate to call me at the phone number listed below.    Sincerely,          Saran Cooper M.D.  [phone]

## 2023-03-29 NOTE — PROGRESS NOTES
Emanuel Nascimento is a 37 y.o. male who presents for Food Poisoning (X 2-3 days with diarrhea )      HPI  This is a new problem. Emanuel Nascimento is a 37 y.o. patient who presents to urgent care with c/o: reports food poisoning. Woke up vomiting 2 days ago. Then developed abd pain with cramping. Then developed diarrhea. No further pain in abdomen. Diarrhea occurred approx 8-9 times. No blood.  Missed work today and yesterday. Feeling run down and tired.   Another person who ate the same foods is also sick. No shell fish/ seafood.   Treatments tried: saltine crackers, energy / sport drinks.   Denies fever, dizziness.    No other aggravating or alleviating factors.       ROS See HPI    Allergies:     No Known Allergies    PMSFS Hx:  No past medical history on file.  Past Surgical History:   Procedure Laterality Date    APPENDECTOMY       No family history on file.  Social History     Tobacco Use    Smoking status: Every Day     Packs/day: 1.00     Years: 15.00     Pack years: 15.00     Types: Cigarettes    Smokeless tobacco: Never   Substance Use Topics    Alcohol use: Yes     Comment: Moderate about 2 drinks a day for 5 days       Problems:   Patient Active Problem List   Diagnosis    Cigarette smoker    Chronic pain of right knee    Instability of both shoulder joints       Medications:   No current outpatient medications on file prior to visit.     No current facility-administered medications on file prior to visit.          Objective:     /76 (BP Location: Right arm, Patient Position: Sitting, BP Cuff Size: Adult long)   Pulse (!) 114   Temp 36.9 °C (98.5 °F) (Temporal)   Resp 16   Ht 1.829 m (6')   Wt 86.2 kg (190 lb)   SpO2 96%   BMI 25.77 kg/m²     Physical Exam  Vitals reviewed.   Cardiovascular:      Rate and Rhythm: Tachycardia present.      Pulses: Normal pulses.   Pulmonary:      Effort: Pulmonary effort is normal.      Breath sounds: Normal breath sounds.   Abdominal:      General: There  is no distension.      Palpations: Abdomen is soft.      Tenderness: There is abdominal tenderness. There is no guarding or rebound.   Skin:     General: Skin is warm.      Capillary Refill: Capillary refill takes less than 2 seconds.   Neurological:      Mental Status: He is alert and oriented to person, place, and time.   Psychiatric:         Mood and Affect: Mood normal.         Behavior: Behavior normal.         Thought Content: Thought content normal.         Assessment /Associated Orders:      1. Gastroenteritis        2. Mild dehydration              Medical Decision Making:    Pt is clinically stable at today's acute urgent care visit.  No acute distress noted. Appropriate for outpatient care at this time.   Acute problem today with uncertain prognosis.  Hx of diarrhea earlier this month with viral illness. Reports that all his symptoms had resolved when this started and that they are not related. Tends to get stomach illnesses.   Possible food poisoning - resolving   Keep well hydrated. Mild tachycardia suggestive of mild dehydration.   OTC anti-diarrhea medication prn if having > 5 liquid stools per day.   Work note given       Discussed Dx, management options (risks,benefits, and alternatives to planned treatment), natural progression and supportive care.  Expressed understanding and the treatment plan was agreed upon.   Questions were encouraged and answered   Return to urgent care prn if new or worsening sx or if there is no improvement in condition prn.          Time I spent evaluating Emanuel Nascimento in urgent care today was 30  minutes. This time includes preparing for visit, reviewing any pertinent notes or test results, counseling/education, exam, obtaining HPI, interpretation of lab tests, medication management and documentation as indicated above.Time does not include separately billable procedures noted .       Please note that this dictation was created using voice recognition software. I  have worked with consultants from the vendor as well as technical experts from [facility] to optimize the interface. I have made every reasonable attempt to correct obvious errors, but I expect that there are errors of grammar and possibly content that I did not discover before finalizing the note.  This note was electronically signed by provider

## 2023-04-18 ENCOUNTER — OFFICE VISIT (OUTPATIENT)
Dept: MEDICAL GROUP | Facility: PHYSICIAN GROUP | Age: 38
End: 2023-04-18
Payer: OTHER GOVERNMENT

## 2023-04-18 VITALS
RESPIRATION RATE: 16 BRPM | BODY MASS INDEX: 25.73 KG/M2 | HEIGHT: 72 IN | DIASTOLIC BLOOD PRESSURE: 84 MMHG | SYSTOLIC BLOOD PRESSURE: 118 MMHG | WEIGHT: 190 LBS | TEMPERATURE: 97.6 F | OXYGEN SATURATION: 99 % | HEART RATE: 115 BPM

## 2023-04-18 DIAGNOSIS — F43.9 STRESS: ICD-10-CM

## 2023-04-18 PROCEDURE — 99213 OFFICE O/P EST LOW 20 MIN: CPT | Performed by: PHYSICIAN ASSISTANT

## 2023-04-18 ASSESSMENT — ANXIETY QUESTIONNAIRES
1. FEELING NERVOUS, ANXIOUS, OR ON EDGE: NEARLY EVERY DAY
7. FEELING AFRAID AS IF SOMETHING AWFUL MIGHT HAPPEN: NOT AT ALL
GAD7 TOTAL SCORE: 8
2. NOT BEING ABLE TO STOP OR CONTROL WORRYING: MORE THAN HALF THE DAYS
3. WORRYING TOO MUCH ABOUT DIFFERENT THINGS: NOT AT ALL
4. TROUBLE RELAXING: NEARLY EVERY DAY
5. BEING SO RESTLESS THAT IT IS HARD TO SIT STILL: NOT AT ALL
6. BECOMING EASILY ANNOYED OR IRRITABLE: NOT AT ALL

## 2023-04-18 ASSESSMENT — PATIENT HEALTH QUESTIONNAIRE - PHQ9
SUM OF ALL RESPONSES TO PHQ QUESTIONS 1-9: 10
CLINICAL INTERPRETATION OF PHQ2 SCORE: 1
5. POOR APPETITE OR OVEREATING: 1 - SEVERAL DAYS

## 2023-04-18 ASSESSMENT — FIBROSIS 4 INDEX: FIB4 SCORE: 0.94

## 2023-04-18 NOTE — PROGRESS NOTES
Subjective:     CC: Anxiety    HPI:   Emanuel presents today with concerns for anxiety and stress at work.  States this has been going on for the last 3 months and has been interfering with his ability to sleep and focus. Feels fine on the weekends so think it is mostly related to work days. Also having some digestive issues which he thinks is related.     History reviewed. No pertinent past medical history.    Social History     Tobacco Use    Smoking status: Every Day     Packs/day: 1.00     Years: 15.00     Pack years: 15.00     Types: Cigarettes    Smokeless tobacco: Never   Vaping Use    Vaping Use: Never used   Substance Use Topics    Alcohol use: Yes     Comment: Moderate about 2 drinks a day for 5 days    Drug use: No       No current Epic-ordered outpatient medications on file.     No current Epic-ordered facility-administered medications on file.       Allergies:  Patient has no known allergies.    Health Maintenance: Completed    ROS:  Gen: no fevers/chills  Eyes: no changes in vision  ENT: no sore throat  Pulm: no sob, no cough  CV: no chest pain  GI: no nausea/vomiting  : no dysuria  MSk: no myalgias  Skin: no rash  Neuro: no headaches  Psych: Positive for depression and anxiety    Objective:       Exam:  /84   Pulse (!) 115   Temp 36.4 °C (97.6 °F) (Temporal)   Resp 16   Ht 1.829 m (6')   Wt 86.2 kg (190 lb)   SpO2 99%   BMI 25.77 kg/m²  Body mass index is 25.77 kg/m².    Constitutional: Alert, no distress, well-groomed.  Skin: Warm, dry, good turgor, no rashes in visible areas.  Eye: Equal, round and reactive, conjunctiva clear, lids normal.  ENMT: Lips without lesions, good dentition, moist mucous membranes.  Neck: Trachea midline, no masses, no thyromegaly.  Respiratory: Unlabored respiratory effort, no cough.  MSK: Normal gait, moves all extremities.  Neuro: Grossly non-focal.   Psych: Alert and oriented x3, normal affect and mood.    Labs: Labs from 2/23/2023 were reviewed and  discussed with the patient. All questions were answered.     Depression Screening    Little interest or pleasure in doing things?  1 - several days   Feeling down, depressed , or hopeless? 0 - not at all   Trouble falling or staying asleep, or sleeping too much?  3 - nearly every day   Feeling tired or having little energy?  1 - several days   Poor appetite or overeating?  1 - several days   Feeling bad about yourself - or that you are a failure or have let yourself or your family down? 1 - several days   Trouble concentrating on things, such as reading the newspaper or watching television? 2 - more than half the days   Moving or speaking so slowly that other people could have noticed.  Or the opposite - being so fidgety or restless that you have been moving around a lot more than usual?  1 - several days   Thoughts that you would be better off dead, or of hurting yourself?  0 - not at all   Patient Health Questionnaire Score: 10     If depressive symptoms identified deferred to follow up visit unless specifically addressed in assesment and plan.    Interpretation of PHQ-9 Total Score   Score Severity   1-4 No Depression   5-9 Mild Depression   10-14 Moderate Depression   15-19 Moderately Severe Depression   20-27 Severe Depression    SHWETA-7 Questionnaire    Feeling nervous, anxious, or on edge: Nearly every day  Not being able to sop or control worrying: More than half the days  Worrying too much about different things: Not at all  Trouble relaxing: Nearly every day  Being so restless that it's hard to sit still: Not at all  Becoming easily annoyed or irritable: Not at all  Feeling afraid as if something awful might happen: Not at all  Total: 8    Interpretation of SHWETA 7 Total Score   Score Severity :  0-4 No Anxiety   5-9 Mild Anxiety  10-14 Moderate Anxiety  15-21 Severe Anxiety    Assessment & Plan:     37 y.o. male with the following -     1. Stress  Chronic, worsening.  Patient has had an increased level of  stress primarily related to his job recently.  Referral to behavioral health placed for counseling.  Briefly discussed medication, but patient states he would like to refrain from this given his job.  - Referral to Behavioral Health    I spent a total of 25 minutes with record review (including external notes and labs), exam, communication with the patient, communication with other providers, and documentation of this encounter.     Return in about 3 months (around 7/18/2023), or if symptoms worsen or fail to improve.    Please note that this dictation was created using voice recognition software. I have made every reasonable attempt to correct obvious errors, but I expect that there are errors of grammar and possibly content that I did not discover before finalizing the note.    Electronically signed by Niecy Middleton PA-C on April 18, 2023

## 2023-04-25 ENCOUNTER — PATIENT MESSAGE (OUTPATIENT)
Dept: SPORTS MEDICINE | Facility: CLINIC | Age: 38
End: 2023-04-25
Payer: OTHER GOVERNMENT

## 2023-04-25 DIAGNOSIS — F43.9 STRESS: ICD-10-CM

## 2023-05-10 ENCOUNTER — OFFICE VISIT (OUTPATIENT)
Dept: SPORTS MEDICINE | Facility: CLINIC | Age: 38
End: 2023-05-10
Payer: OTHER GOVERNMENT

## 2023-05-10 VITALS
RESPIRATION RATE: 16 BRPM | DIASTOLIC BLOOD PRESSURE: 80 MMHG | OXYGEN SATURATION: 96 % | TEMPERATURE: 97.9 F | HEART RATE: 84 BPM | SYSTOLIC BLOOD PRESSURE: 114 MMHG

## 2023-05-10 DIAGNOSIS — G89.29 CHRONIC PAIN OF RIGHT KNEE: ICD-10-CM

## 2023-05-10 DIAGNOSIS — M25.561 CHRONIC PAIN OF RIGHT KNEE: ICD-10-CM

## 2023-05-10 PROCEDURE — 20610 DRAIN/INJ JOINT/BURSA W/O US: CPT | Mod: RT | Performed by: FAMILY MEDICINE

## 2023-05-10 RX ORDER — HYALURONATE SODIUM 10 MG/ML
20 SYRINGE (ML) INTRAARTICULAR ONCE
Status: COMPLETED | OUTPATIENT
Start: 2023-05-10 | End: 2023-05-10

## 2023-05-10 RX ADMIN — Medication 20 MG: at 15:06

## 2023-05-10 ASSESSMENT — ENCOUNTER SYMPTOMS: FEVER: 0

## 2023-05-10 NOTE — PROGRESS NOTES
Subjective:     Emanuel Nascimento is a 37 y.o. male who presents for Knee Pain (Follow up for Rt. knee)      HPI  Pt presents for injection #1 of 3 of Hyalgan   Patient has had no fall or injury   Pt has no swelling or redness of the knee   Failed steroid injection and PT     Review of Systems   Constitutional:  Negative for fever.   Skin:  Negative for rash.       PMH:  has no past medical history on file.  MEDS: No current outpatient medications on file.  ALLERGIES: No Known Allergies  SURGHX:   Past Surgical History:   Procedure Laterality Date    APPENDECTOMY       SOCHX:  reports that he has been smoking cigarettes. He has a 15.00 pack-year smoking history. He has never used smokeless tobacco. He reports current alcohol use. He reports that he does not use drugs.     Objective:   /80 (BP Location: Right arm, Patient Position: Sitting, BP Cuff Size: Adult long)   Pulse 84   Temp 36.6 °C (97.9 °F) (Temporal)   Resp 16   SpO2 96%     Physical Exam  Constitutional:       General: He is not in acute distress.     Appearance: He is well-developed. He is not diaphoretic.   Pulmonary:      Effort: Pulmonary effort is normal.   Neurological:      Mental Status: He is alert.     Right knee   No erythema, swelling, or laceration     Assessment/Plan:   Assessment    1. Chronic pain of right knee    Other orders  - sodium hyaluronate SOSY 20 mg    Knee injection with Hyalgan   First, a verbal consent and a verbal time-out were done, explaining the risks and benefits of the procedure. Then the patient was placed in a seated position. Anterior lateral joint line portals were identified. The skin was cleaned with alcohol and the clean, no touch technique was used with a 21-gauge 1.5-inch needle. First, 5 milliliters 1% lidocaine without epinephrine was injected into the subcutaneous and intra articular space, then followed by a 2 milliliters of Hyalgan into the right knee without removing the needle. The patient  tolerated the procedure well and there were no immediate post injection complications. Hemostasis was then achieved with gentle pressure and a Band-Aid was placed over the lesion.  Post injection instructions for range of motion, ice, activity modification, signs of infection, emergency precautions were discussed with the patient.

## 2023-05-17 ENCOUNTER — OFFICE VISIT (OUTPATIENT)
Dept: SPORTS MEDICINE | Facility: CLINIC | Age: 38
End: 2023-05-17
Payer: OTHER GOVERNMENT

## 2023-05-17 VITALS
BODY MASS INDEX: 25.73 KG/M2 | TEMPERATURE: 98.2 F | OXYGEN SATURATION: 98 % | RESPIRATION RATE: 16 BRPM | DIASTOLIC BLOOD PRESSURE: 76 MMHG | SYSTOLIC BLOOD PRESSURE: 118 MMHG | HEIGHT: 72 IN | WEIGHT: 190 LBS | HEART RATE: 78 BPM

## 2023-05-17 DIAGNOSIS — M25.561 CHRONIC PAIN OF RIGHT KNEE: ICD-10-CM

## 2023-05-17 DIAGNOSIS — G89.29 CHRONIC PAIN OF RIGHT KNEE: ICD-10-CM

## 2023-05-17 PROCEDURE — 20610 DRAIN/INJ JOINT/BURSA W/O US: CPT | Mod: RT | Performed by: FAMILY MEDICINE

## 2023-05-17 PROCEDURE — 3078F DIAST BP <80 MM HG: CPT | Performed by: FAMILY MEDICINE

## 2023-05-17 PROCEDURE — 3074F SYST BP LT 130 MM HG: CPT | Performed by: FAMILY MEDICINE

## 2023-05-17 RX ORDER — HYALURONATE SODIUM 10 MG/ML
20 SYRINGE (ML) INTRAARTICULAR ONCE
Status: COMPLETED | OUTPATIENT
Start: 2023-05-17 | End: 2023-05-17

## 2023-05-17 RX ADMIN — Medication 20 MG: at 14:44

## 2023-05-17 ASSESSMENT — ENCOUNTER SYMPTOMS: FEVER: 0

## 2023-05-17 ASSESSMENT — FIBROSIS 4 INDEX: FIB4 SCORE: 0.94

## 2023-05-17 NOTE — PROGRESS NOTES
Subjective:     Emanuel Nascimento is a 37 y.o. male who presents for Knee Pain (F/V R knee pain, injection )      HPI  Pt presents for injection #2 of 3 of Hyalgan   Has had no fall or injury since last visit  No swelling or redness since last time  Has had slight improvement in the knee pain since first injection  No new complaints today    Review of Systems   Constitutional:  Negative for fever.   Skin:  Negative for rash.       PMH:  has no past medical history on file.  MEDS: No current outpatient medications on file.    Current Facility-Administered Medications:     sodium hyaluronate SOSY 20 mg, 20 mg, Intra-articular, Once, Saran Cooper M.D.  ALLERGIES: No Known Allergies  SURGHX:   Past Surgical History:   Procedure Laterality Date    APPENDECTOMY       SOCHX:  reports that he has been smoking cigarettes. He has a 15.00 pack-year smoking history. He has never used smokeless tobacco. He reports current alcohol use. He reports that he does not use drugs.     Objective:   /76 (BP Location: Left arm, Patient Position: Sitting, BP Cuff Size: Adult)   Pulse 78   Temp 36.8 °C (98.2 °F) (Temporal)   Resp 16   Ht 1.829 m (6')   Wt 86.2 kg (190 lb)   SpO2 98%   BMI 25.77 kg/m²     Physical Exam  Constitutional:       General: He is not in acute distress.     Appearance: He is well-developed. He is not diaphoretic.   Pulmonary:      Effort: Pulmonary effort is normal.   Neurological:      Mental Status: He is alert.     Right knee  Full range of motion, no effusion, no bruising or erythema    Assessment/Plan:   Assessment    1. Chronic pain of right knee    Other orders  - sodium hyaluronate SOSY 20 mg    Knee injection with Hyalgan   First, a verbal consent and a verbal time-out were done, explaining the risks and benefits of the procedure. Then the patient was placed in a seated position. Anterior lateral joint line portals were identified. The skin was cleaned with alcohol and the clean, no  touch technique was used with a 21-gauge 1.5-inch needle. First, 5 milliliters 1% lidocaine without epinephrine was injected into the subcutaneous and intra articular space, then followed by a 2 milliliters of Hyalgan into the right knee without removing the needle. The patient tolerated the procedure well and there were no immediate post injection complications. Hemostasis was then achieved with gentle pressure and a Band-Aid was placed over the lesion. Post injection instructions for range of motion, ice, activity modification, signs of infection, emergency precautions were discussed with the patient.     Follow-up in 1 week for injection #3 of 3

## 2023-05-24 ENCOUNTER — OFFICE VISIT (OUTPATIENT)
Dept: SPORTS MEDICINE | Facility: CLINIC | Age: 38
End: 2023-05-24
Payer: OTHER GOVERNMENT

## 2023-05-24 VITALS — HEIGHT: 72 IN | WEIGHT: 190 LBS | BODY MASS INDEX: 25.73 KG/M2

## 2023-05-24 DIAGNOSIS — M25.561 CHRONIC PAIN OF RIGHT KNEE: ICD-10-CM

## 2023-05-24 DIAGNOSIS — G89.29 CHRONIC PAIN OF RIGHT KNEE: ICD-10-CM

## 2023-05-24 PROCEDURE — 20610 DRAIN/INJ JOINT/BURSA W/O US: CPT | Mod: RT | Performed by: FAMILY MEDICINE

## 2023-05-24 RX ORDER — HYALURONATE SODIUM 10 MG/ML
20 SYRINGE (ML) INTRAARTICULAR ONCE
Status: COMPLETED | OUTPATIENT
Start: 2023-05-24 | End: 2023-05-24

## 2023-05-24 RX ADMIN — Medication 20 MG: at 15:08

## 2023-05-24 ASSESSMENT — FIBROSIS 4 INDEX: FIB4 SCORE: 0.97

## 2023-05-24 NOTE — PROGRESS NOTES
Subjective:     Emanuel Nascimento is a 38 y.o. male who presents for Knee Pain (F/V R knee pain )      HPI  Pt presents for injection #3 of 3 of Hyalgan   Has been doing well since the past 2 injections  No swelling of the knee, no redness, no new problems  Pain has improved a bit since the last injections     Objective:   Ht 1.829 m (6')   Wt 86.2 kg (190 lb)   BMI 25.77 kg/m²     Physical Exam  Constitutional:       General: He is not in acute distress.     Appearance: He is well-developed. He is not diaphoretic.   Pulmonary:      Effort: Pulmonary effort is normal.   Neurological:      Mental Status: He is alert.     Right knee with no swelling, erythema, or bruising    Assessment/Plan:   Assessment    1. Chronic pain of right knee  - sodium hyaluronate SOSY 20 mg    Knee injection with Hyalgan   First, a verbal consent and a verbal time-out were done, explaining the risks and benefits of the procedure. Then the patient was placed in a seated position. Anterior lateral joint line portals were identified. The skin was cleaned with alcohol and the clean, no touch technique was used with a 21-gauge 1.5-inch needle. First, 5 milliliters 1% lidocaine without epinephrine was injected into the subcutaneous and intra articular space, then followed by a 2 milliliters of Hyalgan into the right knee without removing the needle. The patient tolerated the procedure well and there were no immediate post injection complications. Hemostasis was then achieved with gentle pressure and a Band-Aid was placed over the lesion. Post injection instructions for range of motion, ice, activity modification, signs of infection, emergency precautions were discussed with the patient.     Patient tolerated third injection well with no acute complication.  We will see how he does over the next few months.  If injection is working well, will follow-up when he feels the injection is starting to wear off.  If injection is not working well in a  few months, may need to consider other treatments or surgical referral.

## 2024-10-23 ENCOUNTER — OFFICE VISIT (OUTPATIENT)
Dept: URGENT CARE | Facility: PHYSICIAN GROUP | Age: 39
End: 2024-10-23
Payer: OTHER GOVERNMENT

## 2024-10-23 VITALS
BODY MASS INDEX: 24.95 KG/M2 | DIASTOLIC BLOOD PRESSURE: 78 MMHG | OXYGEN SATURATION: 98 % | HEIGHT: 72 IN | RESPIRATION RATE: 16 BRPM | HEART RATE: 79 BPM | TEMPERATURE: 98.3 F | SYSTOLIC BLOOD PRESSURE: 122 MMHG | WEIGHT: 184.19 LBS

## 2024-10-23 DIAGNOSIS — R19.7 DIARRHEA, UNSPECIFIED TYPE: ICD-10-CM

## 2024-10-23 PROCEDURE — 99213 OFFICE O/P EST LOW 20 MIN: CPT

## 2024-10-23 PROCEDURE — 3078F DIAST BP <80 MM HG: CPT

## 2024-10-23 PROCEDURE — 3074F SYST BP LT 130 MM HG: CPT

## 2024-10-23 ASSESSMENT — FIBROSIS 4 INDEX: FIB4 SCORE: 0.99

## 2024-10-23 ASSESSMENT — ENCOUNTER SYMPTOMS
WEAKNESS: 0
SPUTUM PRODUCTION: 0
EYE PAIN: 0
BLURRED VISION: 0
NECK PAIN: 0
BACK PAIN: 0
FLANK PAIN: 0
DOUBLE VISION: 0
WHEEZING: 0
EYE REDNESS: 0
SENSORY CHANGE: 0
TINGLING: 0
NAUSEA: 1
COUGH: 0
SORE THROAT: 0
DIARRHEA: 1
CHILLS: 0
FEVER: 0
EYE DISCHARGE: 0
STRIDOR: 0
MYALGIAS: 0
VOMITING: 1
SINUS PAIN: 0
DIZZINESS: 0
BLOOD IN STOOL: 0
SHORTNESS OF BREATH: 0
HEADACHES: 0
PHOTOPHOBIA: 0

## 2024-10-23 ASSESSMENT — VISUAL ACUITY: OU: 1

## 2024-12-10 ENCOUNTER — OFFICE VISIT (OUTPATIENT)
Dept: URGENT CARE | Facility: PHYSICIAN GROUP | Age: 39
End: 2024-12-10
Payer: OTHER GOVERNMENT

## 2024-12-10 VITALS
DIASTOLIC BLOOD PRESSURE: 76 MMHG | HEIGHT: 72 IN | SYSTOLIC BLOOD PRESSURE: 114 MMHG | TEMPERATURE: 97.3 F | WEIGHT: 186.4 LBS | OXYGEN SATURATION: 99 % | HEART RATE: 88 BPM | RESPIRATION RATE: 13 BRPM | BODY MASS INDEX: 25.25 KG/M2

## 2024-12-10 DIAGNOSIS — R05.1 ACUTE COUGH: ICD-10-CM

## 2024-12-10 PROCEDURE — 3074F SYST BP LT 130 MM HG: CPT | Performed by: PHYSICIAN ASSISTANT

## 2024-12-10 PROCEDURE — 99213 OFFICE O/P EST LOW 20 MIN: CPT | Performed by: PHYSICIAN ASSISTANT

## 2024-12-10 PROCEDURE — 3078F DIAST BP <80 MM HG: CPT | Performed by: PHYSICIAN ASSISTANT

## 2024-12-10 RX ORDER — PREDNISONE 10 MG/1
40 TABLET ORAL DAILY
Qty: 20 TABLET | Refills: 0 | Status: SHIPPED | OUTPATIENT
Start: 2024-12-10 | End: 2024-12-15

## 2024-12-10 ASSESSMENT — ENCOUNTER SYMPTOMS
ABDOMINAL PAIN: 0
SHORTNESS OF BREATH: 0
DIARRHEA: 0
MYALGIAS: 0
CONSTIPATION: 0
VOMITING: 0
NAUSEA: 0
SORE THROAT: 0
COUGH: 1
FEVER: 1
HEADACHES: 1
CHILLS: 0
EYE PAIN: 0

## 2024-12-10 ASSESSMENT — FIBROSIS 4 INDEX: FIB4 SCORE: 0.99

## 2024-12-10 NOTE — LETTER
December 10, 2024    To Whom It May Concern:         This is confirmation that Emanuel Nascimento attended his scheduled appointment with Peter Acevedo P.A.-C. on 12/10/24.  I believe this patient is recovering from the flu starting on 12/6 Friday.  I think he may benefit from an additional 24-72 hours out of work to recover. Once his energy has improved he is cleared to return at any point.         If you have any questions please do not hesitate to call me at the phone number listed below.    Sincerely,          Peter Acevedo P.A.-C.  [phone]

## 2024-12-10 NOTE — PROGRESS NOTES
Subjective:   Emanuel Nascimento is a 39 y.o. male who presents for Lightheadedness (C/o fatigue, sob, headaches, fever x 3 days. States he just got over being sick right before Sx started.)      5 days ago onset of fever, cough, congestion, headache, body aches. Temperature varied around 102.0.  Fever improved 3 days ago, and now patient just feels exhausted and quite tired. Cough has resolved but he's concerned about how he feels. No dyspnea.     Review of Systems   Constitutional:  Positive for fever and malaise/fatigue. Negative for chills.   HENT:  Positive for congestion. Negative for ear pain and sore throat.    Eyes:  Negative for pain.   Respiratory:  Positive for cough. Negative for shortness of breath.    Cardiovascular:  Negative for chest pain.   Gastrointestinal:  Negative for abdominal pain, constipation, diarrhea, nausea and vomiting.   Genitourinary:  Negative for dysuria.   Musculoskeletal:  Negative for myalgias.   Skin:  Negative for rash.   Neurological:  Positive for headaches.       Medications, Allergies, and current problem list reviewed today in Epic.     Objective:     /76 (BP Location: Left arm, Patient Position: Sitting, BP Cuff Size: Adult long)   Pulse 88   Temp 36.3 °C (97.3 °F) (Temporal)   Resp 13   Ht 1.829 m (6')   Wt 84.5 kg (186 lb 6.4 oz)   SpO2 99%     Physical Exam  Vitals reviewed.   Constitutional:       Appearance: Normal appearance.   HENT:      Head: Normocephalic and atraumatic.      Right Ear: Tympanic membrane, ear canal and external ear normal.      Left Ear: Tympanic membrane, ear canal and external ear normal.      Nose: Congestion and rhinorrhea present.      Mouth/Throat:      Mouth: Mucous membranes are moist.   Eyes:      Conjunctiva/sclera: Conjunctivae normal.      Pupils: Pupils are equal, round, and reactive to light.   Cardiovascular:      Rate and Rhythm: Normal rate and regular rhythm.   Pulmonary:      Effort: Pulmonary effort is normal.       Breath sounds: Normal breath sounds.   Skin:     General: Skin is warm and dry.      Capillary Refill: Capillary refill takes less than 2 seconds.   Neurological:      Mental Status: He is alert and oriented to person, place, and time.         Assessment/Plan:     Diagnosis and associated orders:     1. Acute cough  predniSONE (DELTASONE) 10 MG Tab         Comments/MDM:     Patient with recent flulike illness, at present he is overall nontoxic-appearing with stable vitals and anticipate he just requires a longer duration of rest and recovery and his energy and other symptoms should resolve completely.  I did prescribe him prednisone and gave him a paper prescription, if he continues to feel like this and it has been greater than 1 week since he is for started noticing symptoms I think prednisone would be reasonable at that point but I do not see a clear clinical benefit to him starting it today         Differential diagnosis, natural history, supportive care, and indications for immediate follow-up discussed.    Advised the patient to follow-up with the primary care physician for recheck, reevaluation, and consideration of further management.    Please note that this dictation was created using voice recognition software. I have made a reasonable attempt to correct obvious errors, but I expect that there are errors of grammar and possibly content that I did not discover before finalizing the note.    This note was electronically signed by Peter Acevedo PA-C

## 2025-02-03 ENCOUNTER — APPOINTMENT (OUTPATIENT)
Dept: MEDICAL GROUP | Facility: PHYSICIAN GROUP | Age: 40
End: 2025-02-03
Payer: OTHER GOVERNMENT

## 2025-02-03 VITALS
OXYGEN SATURATION: 96 % | DIASTOLIC BLOOD PRESSURE: 82 MMHG | TEMPERATURE: 98.5 F | RESPIRATION RATE: 20 BRPM | BODY MASS INDEX: 25.73 KG/M2 | WEIGHT: 190 LBS | SYSTOLIC BLOOD PRESSURE: 120 MMHG | HEIGHT: 72 IN | HEART RATE: 84 BPM

## 2025-02-03 DIAGNOSIS — M25.532 PAIN IN BOTH WRISTS: ICD-10-CM

## 2025-02-03 DIAGNOSIS — M25.531 PAIN IN BOTH WRISTS: ICD-10-CM

## 2025-02-03 PROCEDURE — 3074F SYST BP LT 130 MM HG: CPT | Performed by: PHYSICIAN ASSISTANT

## 2025-02-03 PROCEDURE — 3079F DIAST BP 80-89 MM HG: CPT | Performed by: PHYSICIAN ASSISTANT

## 2025-02-03 PROCEDURE — 99214 OFFICE O/P EST MOD 30 MIN: CPT | Performed by: PHYSICIAN ASSISTANT

## 2025-02-03 ASSESSMENT — PATIENT HEALTH QUESTIONNAIRE - PHQ9: CLINICAL INTERPRETATION OF PHQ2 SCORE: 0

## 2025-02-03 ASSESSMENT — FIBROSIS 4 INDEX: FIB4 SCORE: 0.99

## 2025-02-04 NOTE — PROGRESS NOTES
Verbal consent was acquired by the patient to use [employer] ambient listening note generation during this visit     Subjective:     HPI:   History of Present Illness  The patient is a 39-year-old male presenting with bilateral wrist pain persisting for 1 month. He reports experiencing intermittent wrist trauma resulting in tenderness and weakness, each episode lasting approximately 2 days. Currently, he describes constant pain in both wrists with fluctuating intensity. The pain in the right wrist radiates to the hand, whereas the pain in the left wrist remains localized. He recalls an episode of severe pain in the right hand during activities such as handwashing and driving, which lasted for one day. He denies any other joint pain. The patient suspects carpal tunnel syndrome as a potential diagnosis. He attributes his symptoms to extensive keyboard use and repetitive firearm handling. He maintains an active lifestyle with regular exercise. He has not sought pharmacological treatment. The use of an ergonomic keyboard and mouse has provided some symptomatic relief.    Supplemental Information  The patient has a history of shoulder issues that may necessitate reconstructive surgery. Knee problems have been evaluated with x-rays and MRIs, which appeared normal. Intra-articular injections have been effective in managing his knee symptoms.    FAMILY HISTORY  No family history of autoimmune conditions such as rheumatoid arthritis or psoriatic arthritis.    Health Maintenance: Completed    ROS:  Gen: no fevers/chills  Eyes: no changes in vision  ENT: no sore throat  Pulm: no sob, no cough  CV: no chest pain  GI: no nausea/vomiting  : no dysuria  MSk: pos for wrist pain  Skin: no rash  Neuro: no headaches  Psych: no depression, no anxiety    Objective:     Exam:  /82   Pulse 84   Temp 36.9 °C (98.5 °F) (Temporal)   Resp 20   Ht 1.829 m (6')   Wt 86.2 kg (190 lb)   SpO2 96%   BMI 25.77 kg/m²  Body mass index  is 25.77 kg/m².    PHYSICAL EXAM  Constitutional: Alert, no distress, well-groomed.  Skin: Warm, dry, good turgor, no rashes in visible areas.  Eye: Equal, round and reactive, conjunctiva clear, lids normal.  ENMT: Lips without lesions, good dentition, moist mucous membranes.  Neck: Trachea midline, no masses, no thyromegaly.  Respiratory: Unlabored respiratory effort, no cough.  MSK: Normal gait, moves all extremities.  Neuro: Grossly non-focal.   Psych: Alert and oriented x3, normal affect and mood.      Results      Assessment & Plan:     1. Pain in both wrists  Referral to Orthopedics    Meloxicam 15 MG TABLET DISPERSIBLE          Assessment & Plan  1. Bilateral wrist pain: Chronic, not well-controlled.  Possible carpal tunnel syndrome or osteoarthritis. Likely nerve compression exacerbated by repetitive movements and certain sleeping positions.  - Maintain neutral wrist position  - Use ergonomic equipment  - Avoid wrist-straining exercises  - Consider icing  - Prescribed meloxicam, one tablet daily or as needed for severe pain  - Purchase wrist braces  - Referral to orthopedics  -Will defer imaging    Follow-up  - Orthopedic referral to be scheduled        I spent a total of 25 minutes with record review, exam, communication with the patient, communication with other providers, and documentation of this encounter.    Please note that this dictation was created using voice recognition software. I have made every reasonable attempt to correct obvious errors, but I expect that there are errors of grammar and possibly content that I did not discover before finalizing the note.

## 2025-02-26 PROBLEM — G56.03 CARPAL TUNNEL SYNDROME ON BOTH SIDES: Status: ACTIVE | Noted: 2025-02-26

## 2025-06-25 NOTE — LETTER
January 30, 2023       Patient: Emanuel Nascimento   YOB: 1985   Date of Visit: 1/30/2023         To Whom It May Concern:    In my medical opinion, I recommend that Emanuel Nascimento be excused from work 1/30/2023 due to illness.    If you have any questions or concerns, please don't hesitate to call [phone]          Sincerely,          TAMIKO Mayen.  Electronically Signed      [Well Developed] : well developed [Normal Conjunctiva] : normal conjunctiva [Normal Venous Pressure] : normal venous pressure [Normal S1, S2] : normal S1, S2 [No Murmur] : no murmur

## 2025-07-17 ENCOUNTER — OFFICE VISIT (OUTPATIENT)
Dept: URGENT CARE | Facility: PHYSICIAN GROUP | Age: 40
End: 2025-07-17
Payer: OTHER GOVERNMENT

## 2025-07-17 VITALS
DIASTOLIC BLOOD PRESSURE: 70 MMHG | OXYGEN SATURATION: 98 % | WEIGHT: 189.4 LBS | HEART RATE: 106 BPM | TEMPERATURE: 98.6 F | SYSTOLIC BLOOD PRESSURE: 116 MMHG | RESPIRATION RATE: 16 BRPM | HEIGHT: 72 IN | BODY MASS INDEX: 25.65 KG/M2

## 2025-07-17 DIAGNOSIS — J06.9 VIRAL URI: Primary | ICD-10-CM

## 2025-07-17 LAB
FLUAV RNA SPEC QL NAA+PROBE: NEGATIVE
FLUBV RNA SPEC QL NAA+PROBE: NEGATIVE
RSV RNA SPEC QL NAA+PROBE: NEGATIVE
SARS-COV-2 RNA RESP QL NAA+PROBE: NEGATIVE

## 2025-07-17 PROCEDURE — 3074F SYST BP LT 130 MM HG: CPT | Performed by: PHYSICIAN ASSISTANT

## 2025-07-17 PROCEDURE — 87637 SARSCOV2&INF A&B&RSV AMP PRB: CPT | Performed by: PHYSICIAN ASSISTANT

## 2025-07-17 PROCEDURE — 3078F DIAST BP <80 MM HG: CPT | Performed by: PHYSICIAN ASSISTANT

## 2025-07-17 PROCEDURE — 99213 OFFICE O/P EST LOW 20 MIN: CPT | Performed by: PHYSICIAN ASSISTANT

## 2025-07-17 ASSESSMENT — ENCOUNTER SYMPTOMS
EYE DISCHARGE: 0
HEADACHES: 1
FEVER: 1
VOMITING: 0
CHANGE IN BOWEL HABIT: 0
BODY ACHES: 1
SORE THROAT: 0
MYALGIAS: 1
SHORTNESS OF BREATH: 1
EYE REDNESS: 0
NAUSEA: 0
COUGH: 1
CHILLS: 1
DIARRHEA: 0

## 2025-07-17 NOTE — PROGRESS NOTES
Subjective     Emanuel Nascimento is a 40 y.o. male who presents with Body Aches (X 7-14-25. Began with fever and now progressing into chills, persistent fever, body aches.)            Generalized Body Aches  This is a new problem. Episode onset: x 3-4 days ago. The problem occurs constantly. The problem has been gradually improving. Associated symptoms include chills, coughing (The patient reports a slight intermittent cough.), a fever (The patient reports no associated fever at the onset of symptoms, now improved), headaches and myalgias. Pertinent negatives include no change in bowel habit, chest pain, congestion, nausea, sore throat or vomiting. He has tried nothing for the symptoms.     The patient reports no recent sick contacts.    PMH:  has no past medical history on file.  MEDS: Current Medications[1]  ALLERGIES: Allergies[2]  SURGHX: Past Surgical History[3]  SOCHX:  reports that he has been smoking cigarettes. He has a 15 pack-year smoking history. He has never used smokeless tobacco. He reports current alcohol use. He reports that he does not use drugs.  FH: Family history was reviewed, no pertinent findings to report    Review of Systems   Constitutional:  Positive for chills and fever (The patient reports no associated fever at the onset of symptoms, now improved).   HENT:  Negative for congestion, ear pain and sore throat.    Eyes:  Negative for discharge and redness.   Respiratory:  Positive for cough (The patient reports a slight intermittent cough.) and shortness of breath (The patient reports intermittent shortness of breath.).    Cardiovascular:  Negative for chest pain and leg swelling.   Gastrointestinal:  Negative for change in bowel habit, diarrhea, nausea and vomiting.   Musculoskeletal:  Positive for myalgias.   Neurological:  Positive for headaches.              Objective     /70 (BP Location: Left arm, Patient Position: Sitting, BP Cuff Size: Adult long)   Pulse (!) 106   Temp 37  °C (98.6 °F) (Temporal)   Resp 16   Ht 1.829 m (6')   Wt 85.9 kg (189 lb 6.4 oz)   SpO2 98%   BMI 25.69 kg/m²      Physical Exam  Constitutional:       General: He is not in acute distress.     Appearance: Normal appearance. He is not ill-appearing.   HENT:      Head: Normocephalic and atraumatic.      Right Ear: Tympanic membrane, ear canal and external ear normal.      Left Ear: Tympanic membrane, ear canal and external ear normal.      Nose: Nose normal.      Mouth/Throat:      Mouth: Mucous membranes are moist.      Pharynx: Oropharynx is clear. No posterior oropharyngeal erythema.   Eyes:      Extraocular Movements: Extraocular movements intact.      Conjunctiva/sclera: Conjunctivae normal.   Cardiovascular:      Rate and Rhythm: Normal rate and regular rhythm.      Heart sounds: Normal heart sounds.   Pulmonary:      Effort: Pulmonary effort is normal. No respiratory distress.      Breath sounds: Normal breath sounds. No wheezing or rhonchi.   Musculoskeletal:         General: Normal range of motion.      Cervical back: Normal range of motion and neck supple.   Skin:     General: Skin is warm and dry.   Neurological:      Mental Status: He is alert and oriented to person, place, and time.                  Progress:  Results for orders placed or performed in visit on 07/17/25   POCT CoV-2, Flu A/B, RSV by PCR    Collection Time: 07/17/25  1:53 PM   Result Value Ref Range    SARS-CoV-2 by PCR Negative Negative, Invalid    Influenza virus A RNA Negative Negative, Invalid    Influenza virus B, PCR Negative Negative, Invalid    RSV, PCR Negative Negative, Invalid                     Assessment & Plan  Viral URI    Orders:    POCT CEPHEID GROUP A STREP - PCR         The patient's presenting symptoms and physical exam findings are consistent with a viral URI.  The patient's physical exam today in clinic was normal.  The patient is nontoxic and appears in no acute distress.  The patient's vital signs are stable  and within normal limits.  He is afebrile today in clinic.  Discussed likely viral etiology with the patient.  The patient's POCT Cepheid viral testing today in clinic was negative for COVID-19, influenza, and RSV.  Advised the patient to monitor for worsening signs or symptoms.  Recommend OTC medications and supportive care for symptomatic management.  Recommend the patient follow-up with primary care as needed.  Discussed return precautions with the patient, and he verbalized understanding.    Differential diagnoses, supportive care, and indications for immediate follow-up discussed with patient.   Instructed to return to clinic or nearest emergency department for any change in condition, further concerns, or worsening of symptoms.    OTC Tylenol or Motrin for fever/discomfort.  OTC cough/cold medication for symptomatic relief  OTC Supportive Care for Congestion - saline nasal spray or neti pot  Drink plenty of fluids  Follow-up with PCP  Return to clinic or go to the ED if symptoms worsen or fail to improve, or if the patient should develop worsening/increasing cough, congestion, ear pain, sore throat, shortness of breath, wheezing, chest pain, fever/chills, and/or any concerning symptoms.    Discussed plan with the patient, and he agrees to the above.    I personally reviewed prior external notes and test results pertinent to today's visit.  I have independently reviewed and interpreted all diagnostics ordered during this urgent care visit.     Please note that this dictation was created using voice recognition software. I have made every reasonable attempt to correct obvious errors, but I expect that there may be errors of grammar and possibly content that I did not discover before finalizing the note.     This note was electronically signed by Urvashi New PA-C             [1]   Current Outpatient Medications:     Meloxicam 15 MG TABLET DISPERSIBLE, Take 1 Tablet by mouth 1 time a day as needed (moderate to  severe pain). (Patient not taking: Reported on 7/17/2025), Disp: 90 Tablet, Rfl: 1  [2] No Known Allergies  [3]   Past Surgical History:  Procedure Laterality Date    APPENDECTOMY

## 2025-07-17 NOTE — LETTER
July 17, 2025         Patient: Emanuel Nascimento   YOB: 1985   Date of Visit: 7/17/2025           To Whom it May Concern:    Emanuel Nascimento was seen in my clinic on 7/17/2025. Please excuse him from work 7/15, 7/16 and 7/17. He may return to work on 7/18/2025.    If you have any questions or concerns, please don't hesitate to call.        Sincerely,           Urvashi New P.A.-C.  Electronically Signed

## 2025-07-28 ENCOUNTER — OFFICE VISIT (OUTPATIENT)
Dept: URGENT CARE | Facility: PHYSICIAN GROUP | Age: 40
End: 2025-07-28
Payer: OTHER GOVERNMENT

## 2025-07-28 NOTE — PROGRESS NOTES
Subjective:   CHIEF COMPLAINT  Chief Complaint   Patient presents with    Fever     X this morning, body aches, cough, headache, and fatigue.       HPI  Emanuel Nascimento is a 40 y.o. male who presents ***    REVIEW OF SYSTEMS  General: no fever or chills  GI: no nausea or vomiting  See HPI for further details.    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    ALLERGIES  Allergies[1]    CURRENT MEDICATIONS  Home Medications       Reviewed by Luciano Baez D.O. (Physician) on 07/28/25 at 1142  Med List Status: <None>     Medication Last Dose Status   Meloxicam 15 MG TABLET DISPERSIBLE Not Taking Active                    SOCIAL HISTORY  Social History     Tobacco Use    Smoking status: Every Day     Current packs/day: 1.00     Average packs/day: 1 pack/day for 15.0 years (15.0 ttl pk-yrs)     Types: Cigarettes    Smokeless tobacco: Never   Vaping Use    Vaping status: Never Used   Substance and Sexual Activity    Alcohol use: Yes     Comment: Moderate about 2 drinks a day for 5 days    Drug use: No    Sexual activity: Yes     Partners: Female       FAMILY HISTORY  History reviewed. No pertinent family history.       Objective:   PHYSICAL EXAM  VITAL SIGNS: /84   Pulse 88   Temp 36.7 °C (98.1 °F) (Temporal)   Resp 14   Ht 1.829 m (6')   Wt 86.8 kg (191 lb 5.8 oz)   SpO2 97%   BMI 25.95 kg/m²     Gen: no acute distress, normal voice  Skin: dry, intact, moist mucosal membranes  Eyes: No conjunctival injection b/l  Neck: Normal range of motion. No meningeal signs.   ENT: ***  Lungs: No increased work of breathing.  CTAB w/ symmetric expansion  CV: RRR w/o murmurs or clicks  Psych: normal  affect, normal judgement, alert, awake    Assessment/Plan:   No diagnosis found.        Please note that this dictation was created using voice recognition software. I have made a reasonable attempt to correct obvious errors, but I expect that there are errors of grammar and possibly content that I did not discover before finalizing the note.              [1] No Known Allergies

## 2025-07-28 NOTE — LETTER
[facility] [address] [address]     July 28, 2025    Patient: Emanuel Nascimento   YOB: 1985   Date of Visit: 7/28/2025       To Whom It May Concern:    Emanuel Nascimento was seen and treated in our department on 7/28/2025. Please excuse from work today and tomorrow.     Sincerely,     Luciano Baez D.O.

## 2025-07-30 ENCOUNTER — OFFICE VISIT (OUTPATIENT)
Dept: URGENT CARE | Facility: PHYSICIAN GROUP | Age: 40
End: 2025-07-30
Payer: OTHER GOVERNMENT

## 2025-07-30 VITALS
TEMPERATURE: 98.5 F | BODY MASS INDEX: 25.38 KG/M2 | HEART RATE: 87 BPM | HEIGHT: 72 IN | DIASTOLIC BLOOD PRESSURE: 80 MMHG | OXYGEN SATURATION: 96 % | SYSTOLIC BLOOD PRESSURE: 122 MMHG | WEIGHT: 187.39 LBS | RESPIRATION RATE: 15 BRPM

## 2025-07-30 DIAGNOSIS — R53.83 FATIGUE, UNSPECIFIED TYPE: Primary | ICD-10-CM

## 2025-07-30 DIAGNOSIS — R42 DIZZINESS: ICD-10-CM

## 2025-07-30 ASSESSMENT — ENCOUNTER SYMPTOMS
SORE THROAT: 0
CHILLS: 0
VOMITING: 0
SHORTNESS OF BREATH: 0
DIZZINESS: 1
PALPITATIONS: 0
NAUSEA: 0
HEADACHES: 0
FEVER: 0
SENSORY CHANGE: 0
TINGLING: 0
COUGH: 0
LOSS OF CONSCIOUSNESS: 0
ABDOMINAL PAIN: 0
MYALGIAS: 0
DIAPHORESIS: 0
WEAKNESS: 0

## 2025-07-30 NOTE — PROGRESS NOTES
Subjective:     CHIEF COMPLAINT  Chief Complaint   Patient presents with    Follow-Up     Has extreme dizzy and fatigue   Started today        HPI  Emanuel Nascimento is a very pleasant 40 y.o. male who presents to clinic for follow-up regarding recent URI-like symptoms.  Patient originally seen in clinic on 7/28/2025 with acute onset body aches, chills, cough, congestion and fatigue.  States his cough and congestion have gradually improved.  He has been experiencing increased fatigue today.  He stood up this morning to go to work and felt a sensation of lightheadedness after standing up.  States that his vision went slightly blurry and he noted ringing in his ears.  He sat down and symptoms faded away after a few seconds.  He denies any associated chest pain, palpitation or shortness of breath.  States he has been maintaining adequate intake.  No neck pain or stiffness.  No nausea or vomiting.  Requesting a work note.    REVIEW OF SYSTEMS  Review of Systems   Constitutional:  Positive for malaise/fatigue. Negative for chills, diaphoresis and fever.   HENT:  Positive for congestion. Negative for sore throat.    Respiratory:  Negative for cough and shortness of breath.    Cardiovascular:  Negative for chest pain and palpitations.   Gastrointestinal:  Negative for abdominal pain, nausea and vomiting.   Musculoskeletal:  Negative for myalgias.   Neurological:  Positive for dizziness (improved). Negative for tingling, sensory change, loss of consciousness, weakness and headaches.       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    ALLERGIES  Allergies[1]    CURRENT MEDICATIONS  Home Medications        Reviewed by Terell Velez P.A.-C. (Physician Assistant) on 07/30/25 at 1217  Med List Status: <None>     Medication Last Dose Status   Meloxicam 15 MG TABLET DISPERSIBLE Not Taking Active                    SOCIAL HISTORY  Social History     Tobacco Use    Smoking status: Every Day     Current packs/day: 1.00     Average packs/day: 1 pack/day for 15.0 years (15.0 ttl pk-yrs)     Types: Cigarettes    Smokeless tobacco: Never   Vaping Use    Vaping status: Never Used   Substance and Sexual Activity    Alcohol use: Yes     Comment: Moderate about 2 drinks a day for 5 days    Drug use: No    Sexual activity: Yes     Partners: Female       FAMILY HISTORY  History reviewed. No pertinent family history.       Objective:     VITAL SIGNS: /80 (BP Location: Left arm, Patient Position: Sitting, BP Cuff Size: Adult)   Pulse 87   Temp 36.9 °C (98.5 °F) (Temporal)   Resp 15   Ht 1.829 m (6')   Wt 85 kg (187 lb 6.3 oz)   SpO2 96%   BMI 25.41 kg/m²     PHYSICAL EXAM  Physical Exam  Constitutional:       General: He is not in acute distress.     Appearance: Normal appearance. He is not ill-appearing, toxic-appearing or diaphoretic.   HENT:      Head: Normocephalic and atraumatic.      Right Ear: Tympanic membrane, ear canal and external ear normal.      Left Ear: Tympanic membrane, ear canal and external ear normal.      Nose: Nose normal. No congestion or rhinorrhea.      Mouth/Throat:      Mouth: Mucous membranes are moist.      Pharynx: No oropharyngeal exudate or posterior oropharyngeal erythema.   Eyes:      Conjunctiva/sclera: Conjunctivae normal.   Neck:      Vascular: No carotid bruit.   Cardiovascular:      Rate and Rhythm: Normal rate and regular rhythm.      Pulses: Normal pulses.      Heart sounds: Normal heart sounds.   Pulmonary:      Effort: Pulmonary effort is normal.      Breath sounds: Normal breath sounds. No wheezing, rhonchi or rales.   Musculoskeletal:      Cervical back: Normal range of motion.  No muscular tenderness.   Lymphadenopathy:      Cervical: No cervical adenopathy.   Skin:     General: Skin is warm and dry.      Capillary Refill: Capillary refill takes less than 2 seconds.   Neurological:      Mental Status: He is alert.   Psychiatric:         Mood and Affect: Mood normal.         Thought Content: Thought content normal.         Assessment/Plan:     1. Fatigue, unspecified type    2. Dizziness      MDM/Comments:    Pleasant and well-appearing 40-year-old male presents to the clinic with continued fatigue after coming down with URI-like symptoms 2-3 days ago.  States he had an episode this morning where he stood up and abruptly felt lightheaded.  After the patient sat down the symptoms gradually improved.  States he has been maintaining adequate hydration.  In clinic vitals are stable and reassuring.  Mucous membranes moist.  Heart regular rate and rhythm.  Patient history sounds consistent with orthostatic changes.  At this time in clinic he is overall feeling much better.  Advised increasing fluid and electrolyte intake.  Relative rest encouraged.  Return precautions for any worsening symptoms.  Work note provided.    Differential diagnosis, natural history, supportive care, and indications for immediate follow-up discussed. All questions answered. Patient agrees with the plan of care.    Follow-up as needed if symptoms worsen or fail to improve to PCP, Urgent care or Emergency Room.    I have personally reviewed prior external notes and test results pertinent to today's visit.  I have independently reviewed and interpreted all diagnostics ordered during this urgent care acute visit.   Discussed management options (risks,benefits, and alternatives to treatment). Pt expresses understanding and the treatment plan was agreed upon. Questions were encouraged and answered to pt's satisfaction.    Please note that this dictation was created using voice recognition software. I have made a reasonable  attempt to correct obvious errors, but I expect that there are errors of grammar and possibly content that I did not discover before finalizing the note.       [1] No Known Allergies

## 2025-07-30 NOTE — LETTER
July 30, 2025    To Whom It May Concern:         This is confirmation that Emanuel Nascimento attended his scheduled appointment with Terell Velez P.A.-C. on 7/30/25.  Please excuse the patient's absence from work on 7/30/2025.         If you have any questions please do not hesitate to call me at the phone number listed below.    Sincerely,          Terell Velez P.A.-C.  [phone]